# Patient Record
Sex: FEMALE | Employment: FULL TIME | URBAN - METROPOLITAN AREA
[De-identification: names, ages, dates, MRNs, and addresses within clinical notes are randomized per-mention and may not be internally consistent; named-entity substitution may affect disease eponyms.]

---

## 2018-05-23 ENCOUNTER — TRANSFERRED RECORDS (OUTPATIENT)
Dept: MULTI SPECIALTY CLINIC | Facility: CLINIC | Age: 53
End: 2018-05-23

## 2018-05-23 LAB
HPV ABSTRACT: NORMAL
PAP-ABSTRACT: NORMAL

## 2021-12-15 ENCOUNTER — OFFICE VISIT (OUTPATIENT)
Dept: FAMILY MEDICINE | Facility: OTHER | Age: 56
End: 2021-12-15
Payer: COMMERCIAL

## 2021-12-15 VITALS
HEART RATE: 97 BPM | HEIGHT: 63 IN | DIASTOLIC BLOOD PRESSURE: 80 MMHG | TEMPERATURE: 97.8 F | BODY MASS INDEX: 26.58 KG/M2 | WEIGHT: 150 LBS | OXYGEN SATURATION: 97 % | RESPIRATION RATE: 20 BRPM | SYSTOLIC BLOOD PRESSURE: 110 MMHG

## 2021-12-15 DIAGNOSIS — M79.601 PAIN OF RIGHT UPPER EXTREMITY: Primary | ICD-10-CM

## 2021-12-15 PROCEDURE — 99203 OFFICE O/P NEW LOW 30 MIN: CPT | Performed by: PHYSICIAN ASSISTANT

## 2021-12-15 ASSESSMENT — MIFFLIN-ST. JEOR: SCORE: 1239.53

## 2021-12-15 ASSESSMENT — PAIN SCALES - GENERAL: PAINLEVEL: MILD PAIN (2)

## 2021-12-15 NOTE — PROGRESS NOTES
"  Assessment & Plan     Pain of right upper extremity  Likely overuse, she will use a thumb spica splint apply ice and/or heat rest and follow-up if not improving         Tobacco Cessation:   reports that she has been smoking cigarettes. She has been smoking about 0.50 packs per day. She has never used smokeless tobacco.    Not discussed will discuss at upcoming visits    BMI:   Estimated body mass index is 26.57 kg/m  as calculated from the following:    Height as of this encounter: 1.6 m (5' 3\").    Weight as of this encounter: 68 kg (150 lb).   Weight management plan: Patient was referred to their PCP to discuss a diet and exercise plan.        Return in about 2 weeks (around 12/29/2021), or if symptoms worsen or fail to improve.    Christina Trujillo PA-C  [facility] ALBER Cuevas is a 56 year old who presents for the following health issues     HPI     Pain History:  When did you first notice your pain? - Less than 1 week   Have you seen anyone else for your pain? No  Where in your body do you have pain? Musculoskeletal problem/pain  Onset/Duration: 1 week   Description  Location: hand, wrist and arm  - right, she describes discomfort up into her mid upper arm as well.  No pain in her elbow neck or shoulder.  Joint Swelling: no  Redness: no  Pain: YES, particularly with the use of the mouse with using the computer she gets discomfort in her forearm even off into her upper arm she describes it as an ache there is no sharp pain.  She is not able to find any areas that are tender to palpation.  Warmth: no  Intensity:  2/10  Progression of Symptoms:  same and intermittent  Accompanying signs and symptoms:   Fevers: no  Numbness/tingling/weakness: no  History  Trauma to the area: YES, overuse that she found that this started after doing a lot of computer work.  Recent illness:  no  Previous similar problem: no  Previous evaluation:  no  Precipitating or alleviating factors:  Aggravating " "factors include: overuse, computer work  Therapies tried and outcome:  Ibuprofen    She does have a history of a right thumb fusion that was done in 2018    Review of Systems   As documented above       Objective    /80   Pulse 97   Temp 97.8  F (36.6  C) (Temporal)   Resp 20   Ht 1.6 m (5' 3\")   Wt 68 kg (150 lb)   SpO2 97%   Breastfeeding No   BMI 26.57 kg/m    Body mass index is 26.57 kg/m .  Physical Exam   GENERAL: healthy, alert and no distress  NECK: No bony point tenderness of the vertebrae or peer support muscles of the cervical spine  MS: Full range of motion of the elbow and wrists, no tenderness to palpation over the medial lateral epicondyle she is generally tender to palpation over the bicep musculature and into the muscles of her forearm she is also generally tender over the dorsal wrist no tenderness over the extensor tendon of the thumb no erythema, edema or gross deformity                "

## 2022-01-06 ENCOUNTER — TELEPHONE (OUTPATIENT)
Dept: FAMILY MEDICINE | Facility: OTHER | Age: 57
End: 2022-01-06
Payer: COMMERCIAL

## 2022-01-06 NOTE — TELEPHONE ENCOUNTER
She should continue to wear her brace until her appointment Monday unless it is increasing her pain, then she should stop using it.  Christina Trujillo PA-C

## 2022-01-06 NOTE — TELEPHONE ENCOUNTER
Work comp wants to know if patient should continue to wear brace or wait until appointment on Monday.  Nina Dahl, CMA

## 2022-01-10 ENCOUNTER — OFFICE VISIT (OUTPATIENT)
Dept: FAMILY MEDICINE | Facility: OTHER | Age: 57
End: 2022-01-10
Payer: OTHER MISCELLANEOUS

## 2022-01-10 VITALS
SYSTOLIC BLOOD PRESSURE: 130 MMHG | WEIGHT: 150 LBS | TEMPERATURE: 97.6 F | HEIGHT: 63 IN | RESPIRATION RATE: 20 BRPM | HEART RATE: 85 BPM | DIASTOLIC BLOOD PRESSURE: 78 MMHG | BODY MASS INDEX: 26.58 KG/M2 | OXYGEN SATURATION: 98 %

## 2022-01-10 DIAGNOSIS — M67.90 TENDINOPATHY: Primary | ICD-10-CM

## 2022-01-10 DIAGNOSIS — M25.531 RIGHT WRIST PAIN: ICD-10-CM

## 2022-01-10 PROCEDURE — 99214 OFFICE O/P EST MOD 30 MIN: CPT | Performed by: FAMILY MEDICINE

## 2022-01-10 RX ORDER — HYDROXYZINE HYDROCHLORIDE 10 MG/1
1 TABLET, FILM COATED ORAL PRN
COMMUNITY
Start: 2022-01-08 | End: 2022-10-24

## 2022-01-10 RX ORDER — HYDROXYZINE HYDROCHLORIDE 25 MG/1
1 TABLET, FILM COATED ORAL
COMMUNITY
Start: 2022-01-08 | End: 2022-10-03

## 2022-01-10 ASSESSMENT — MIFFLIN-ST. JEOR: SCORE: 1239.53

## 2022-01-10 ASSESSMENT — PAIN SCALES - GENERAL: PAINLEVEL: MODERATE PAIN (4)

## 2022-01-10 NOTE — ASSESSMENT & PLAN NOTE
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 Normal shoulder exam  Discussed a trial of therapy. Can use the  Splint as needed during the day if worsening pain. Can return to work with restrictions. Therapy orders placed. Recheck in 6-8 weeks

## 2022-01-17 ENCOUNTER — TELEPHONE (OUTPATIENT)
Dept: FAMILY MEDICINE | Facility: OTHER | Age: 57
End: 2022-01-17
Payer: COMMERCIAL

## 2022-01-17 NOTE — TELEPHONE ENCOUNTER
Patient would like to change her workability to her normal scheduled hours vs 8 hours per day and the hours she works are longer than this.  Please let patient know if this can be done.   A new workability form will need to be completed.  Also patient believes she should be able to room 50-75 percent of the time if using a tower.  Could this also be adjusted on the workability

## 2022-01-19 ENCOUNTER — THERAPY VISIT (OUTPATIENT)
Dept: OCCUPATIONAL THERAPY | Facility: CLINIC | Age: 57
End: 2022-01-19
Attending: FAMILY MEDICINE
Payer: OTHER MISCELLANEOUS

## 2022-01-19 DIAGNOSIS — M25.531 RIGHT WRIST PAIN: ICD-10-CM

## 2022-01-19 DIAGNOSIS — M67.90 TENDINOPATHY: ICD-10-CM

## 2022-01-19 PROCEDURE — 97166 OT EVAL MOD COMPLEX 45 MIN: CPT | Mod: GO | Performed by: OCCUPATIONAL THERAPIST

## 2022-01-19 PROCEDURE — 97535 SELF CARE MNGMENT TRAINING: CPT | Mod: GO | Performed by: OCCUPATIONAL THERAPIST

## 2022-01-19 PROCEDURE — 97112 NEUROMUSCULAR REEDUCATION: CPT | Mod: GO | Performed by: OCCUPATIONAL THERAPIST

## 2022-01-19 NOTE — TELEPHONE ENCOUNTER
Called and spoke with patient. Patient has been informed of provider message below and in good understanding.*Form has been placed in provider box for review   Margarita Ross, CMA

## 2022-01-19 NOTE — PROGRESS NOTES
Hand Therapy Initial Note     Hand Therapy Initial Evaluation    Current Date:  1/19/2022    Diagnosis: Right arm and wrist pain  DOI: December 1st, 2022  DOS: none,  Procedure:  none  Post:  6 weeks    Precautions: none    Subjective:  Faith Bates is a 56 year old female.    Patient reports symptoms of the right arm hand pain which occurred due to computer use. Since onset symptoms are Gradually getting worse.  General health as reported by patient is good.  Pertinent medical history includes:Smoking  Medical allergies:none.  Surgical history: none, other: Thumb MP fusion 2013.  Medication history: Anti-depressants, Sleep.    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  Job Tasks: Computer Work, Lifting, Carrying, Prolonged Sitting, Pushing, Pulling, Repetitive Tasks    Occupational Profile Information:  Right hand dominant  Prior functional level:  no limitations  Patient reports symptoms of pain and ache, all the way up the arm (traces radial nerve distribution). Soft tissue growth noted, that pt reports is there over a year, that is in need of biopsy.   Special tests:  none.    Previous treatment: none, has wrist brace but did not improve symptoms.   Barriers include:none  Mobility: No difficulty  Transportation: drives  Currently working in normal job with restrictions, only on computer 33%, occasional lifting, no gripping no fine motor tasks, 8 hours per day   Leisure activities/hobbies: normal household tasks, flower gardening.       Functional Outcome Measure:   67/82 difficulty with  and fine motor tasks    Objective:  Pain Level (Scale 0-10)   1/19/2022   At Rest 2/10   With Use 5/10     Pain Description  Date 1/19/2022   Location elbow, wrist, hand and upper arm    Pain Quality Aching like a sore muscle   Frequency constant  But intensity varies   Pain is worst  daytime   Exacerbated by  mouse    Relieved by  NSAIDs. Brace helped wrist but arm continued to ache    Progression Gradually getting worse     Edema  Mild bilateral volar hand and web spaces     Sensation   WNL throughout all nerve distributions; per patient report    Proximal radial nerve tension with Internal rotation of shoulder    ROM   WNL    Strength   R 63 L 50      Assessment:  Patient presents with symptoms consistent with diagnosis of right nerve irritability ,  with conservative intervention.     Patient's limitations or Problem List includes:  Pain of the right elbow, wrist, hand and upper arm which interferes with the patient's ability to perform Work Tasks as compared to previous level of function.    Rehab Potential:  Good - Return to full activity, some limitations    Patient will benefit from skilled Occupational Therapy to decrease pain to return to previous activity level and resume normal daily tasks and to reach their rehab potential.    Barriers to Learning:  No barrier    Communication Issues:  Patient appears to be able to clearly communicate and understand verbal and written communication and follow directions correctly.    Chart Review: Chart Review    Identified Performance Deficits: bathing/showering, hygiene and grooming, home establishment and management, meal preparation and cleanup, shopping and work    Assessment of Occupational Performance:  3-5 Performance Deficits    Clinical Decision Making (Complexity): Moderate complexity    Treatment Explanation:  The following has been discussed with the patient:  RX ordered/plan of care  Anticipated outcomes  Possible risks and side effects    Plan:  Frequency:  1 X week, once daily  Duration:  for 6-8 weeks    Treatment Plan:   Therapeutic Exercise:  ANNAOM  Neuromuscular re-education:  Nerve Gliding, Kinesthetic Training and Proprioceptive Training  Manual Techniques:  Myofascial release    Discharge Plan:  Achieve all LTG.  Independent in home treatment program.  Reach maximal  therapeutic benefit.    Home Exercise Program:  Radial nerve mobilization  Heat PRN,   Monitor sleep   Next Visit:  Assess radial nerve pathway   Monitor and adjust to increase  Nerve mobility and reduce pain   Manual nerve mobilization  Taping to reduce nerve irritability

## 2022-01-26 ENCOUNTER — THERAPY VISIT (OUTPATIENT)
Dept: OCCUPATIONAL THERAPY | Facility: CLINIC | Age: 57
End: 2022-01-26
Payer: OTHER MISCELLANEOUS

## 2022-01-26 DIAGNOSIS — M67.90 TENDINOPATHY: Primary | ICD-10-CM

## 2022-01-26 DIAGNOSIS — M77.11 LATERAL EPICONDYLITIS OF RIGHT ELBOW: ICD-10-CM

## 2022-01-26 DIAGNOSIS — M25.531 RIGHT WRIST PAIN: ICD-10-CM

## 2022-01-26 PROCEDURE — 97112 NEUROMUSCULAR REEDUCATION: CPT | Mod: GO | Performed by: OCCUPATIONAL THERAPIST

## 2022-01-26 PROCEDURE — 97110 THERAPEUTIC EXERCISES: CPT | Mod: GO | Performed by: OCCUPATIONAL THERAPIST

## 2022-01-26 NOTE — PROGRESS NOTES
Hand Therapy Soap Note     Current Date:  1/26/2022    Diagnosis: Right arm and wrist pain  DOI: December 1st, 2022  DOS: none,  Procedure:  none  Post:  7 weeks    Precautions: none    Subjective:  Faith Bates is a 56 year old female.    Patient reports symptoms of the right arm hand pain which occurred due to computer use. Since onset symptoms are Gradually getting worse.  General health as reported by patient is good.  Pertinent medical history includes:Smoking  Medical allergies:none.  Surgical history: none, other: Thumb MP fusion 2013.  Medication history: Anti-depressants, Sleep.    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  Job Tasks: Computer Work, Lifting, Carrying, Prolonged Sitting, Pushing, Pulling, Repetitive Tasks    Occupational Profile Information:  Right hand dominant  Prior functional level:  no limitations  Patient reports symptoms of pain and ache, all the way up the arm (traces radial nerve distribution). Soft tissue growth noted, that pt reports is there over a year, that is in need of biopsy.   Special tests:  none.    Previous treatment: none, has wrist brace but did not improve symptoms.   Barriers include:none  Mobility: No difficulty  Transportation: drives  Currently working in normal job with restrictions, only on computer 33%, occasional lifting, no gripping no fine motor tasks, 8 hours per day   Leisure activities/hobbies: normal household tasks, flower gardening.       Functional Outcome Measure:   67/82 difficulty with  and fine motor tasks    Objective:  Pain Level (Scale 0-10)   1/19/2022   At Rest 2/10   With Use 5/10     Pain Description  Date 1/19/2022   Location elbow, wrist, hand and upper arm    Pain Quality Aching like a sore muscle   Frequency constant  But intensity varies   Pain is worst  daytime   Exacerbated by  mouse    Relieved by NSAIDs. Brace helped wrist but arm  continued to ache    Progression Gradually getting worse     Edema  Mild bilateral volar hand and web spaces     Sensation   WNL throughout all nerve distributions; per patient report    Proximal radial nerve tension with Internal rotation of shoulder    ROM   WNL    Strength   R 63 L 50    Neural Tension Testing  RNT: Radial Neurodynamic Test (based on DMITRY Liriano's ULNT)   1/26/2022   0-5 Scale 1/5   Initially symptoms with IR arm adducted   1/26/22 able to abduct to 30 shoulder IR  Position:   0/5: Arm across abdomen in coronal plane  1/5: Depress shoulder, ER to neutral ABD shoulder to 45 degrees  2/5: IR shoulder to end range, keep elbow at 90 degrees  3/5: Extend elbow to 0 degrees  4/5: Fully pronate forearm  5/5: Flex wrist and fingers with UD  Notes:    (+) indicates beyond grade level but less than FDC to next level    (-) indicates over FDC to level    S1  onset/change of patient's symptoms    S2 definite stop point based on patient's discomfort level      Assessment:  Patient presents with symptoms consistent with diagnosis of right nerve irritability ,  with conservative intervention.     Patient's limitations or Problem List includes:  Pain of the right elbow, wrist, hand and upper arm which interferes with the patient's ability to perform Work Tasks as compared to previous level of function.    Rehab Potential:  Good - Return to full activity, some limitations    Patient will benefit from skilled Occupational Therapy to decrease pain to return to previous activity level and resume normal daily tasks and to reach their rehab potential.    Barriers to Learning:  No barrier    Communication Issues:  Patient appears to be able to clearly communicate and understand verbal and written communication and follow directions correctly.    Chart Review: Chart Review    Identified Performance Deficits: bathing/showering, hygiene and grooming, home establishment and management, meal preparation and  cleanup, shopping and work    Assessment of Occupational Performance:  3-5 Performance Deficits    Clinical Decision Making (Complexity): Moderate complexity    Treatment Explanation:  The following has been discussed with the patient:  RX ordered/plan of care  Anticipated outcomes  Possible risks and side effects    Plan:  Frequency:  1 X week, once daily  Duration:  for 6-8 weeks    Treatment Plan:   Therapeutic Exercise:  SIDDHARTH  Neuromuscular re-education:  Nerve Gliding, Kinesthetic Training and Proprioceptive Training  Manual Techniques:  Myofascial release    Discharge Plan:  Achieve all LTG.  Independent in home treatment program.  Reach maximal therapeutic benefit.    Home Exercise Program:  Radial nerve mobilization progressed proximal and distal glide  Heat PRN,   Monitor sleep   Relaxation exercise to reduce muscle tension      Next Visit:  Assess radial nerve pathway   Monitor and adjust to increase  Nerve mobility and reduce pain   Manual nerve mobilization  Taping to reduce nerve irritability

## 2022-02-02 ENCOUNTER — THERAPY VISIT (OUTPATIENT)
Dept: OCCUPATIONAL THERAPY | Facility: CLINIC | Age: 57
End: 2022-02-02
Payer: OTHER MISCELLANEOUS

## 2022-02-02 DIAGNOSIS — M25.531 RIGHT WRIST PAIN: ICD-10-CM

## 2022-02-02 DIAGNOSIS — M67.90 TENDINOPATHY: Primary | ICD-10-CM

## 2022-02-02 PROCEDURE — 97110 THERAPEUTIC EXERCISES: CPT | Mod: GO | Performed by: OCCUPATIONAL THERAPIST

## 2022-02-02 PROCEDURE — 97112 NEUROMUSCULAR REEDUCATION: CPT | Mod: GO | Performed by: OCCUPATIONAL THERAPIST

## 2022-02-02 NOTE — PROGRESS NOTES
Hand Therapy Soap Note     Current Date:  2/2/2022    Diagnosis: Right arm and wrist pain  DOI: December 1st, 2022  DOS: none,  Procedure:  none  Post:  8 weeks    Precautions: none    Subjective: I woke up last Friday and it really hurt. It is level 5, but if I try to do a BP it increases.   Faith Bates is a 56 year old female.    Patient reports symptoms of the right arm hand pain which occurred due to computer use. Since onset symptoms are Gradually getting worse.  General health as reported by patient is good.  Pertinent medical history includes:Smoking  Medical allergies:none.  Surgical history: none, other: Thumb MP fusion 2013.  Medication history: Anti-depressants, Sleep.    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  Job Tasks: Computer Work, Lifting, Carrying, Prolonged Sitting, Pushing, Pulling, Repetitive Tasks    Occupational Profile Information:  Right hand dominant  Prior functional level:  no limitations  Patient reports symptoms of pain and ache, all the way up the arm (traces radial nerve distribution). Soft tissue growth noted, that pt reports is there over a year, that is in need of biopsy.   Special tests:  none.    Previous treatment: none, has wrist brace but did not improve symptoms.   Barriers include:none  Mobility: No difficulty  Transportation: drives  Currently working in normal job with restrictions, only on computer 33%, occasional lifting, no gripping no fine motor tasks, 8 hours per day   Leisure activities/hobbies: normal household tasks, flower gardening.       Functional Outcome Measure:   67/82 difficulty with  and fine motor tasks    Objective:  Pain Level (Scale 0-10)   1/19/2022   At Rest 2/10   With Use 5/10     Pain Description  Date 1/19/2022   Location elbow, wrist, hand and upper arm    Pain Quality Aching like a sore muscle   Frequency constant  But intensity varies   Pain is  worst  daytime   Exacerbated by  mouse    Relieved by NSAIDs. Brace helped wrist but arm continued to ache    Progression Gradually getting worse     Edema  Mild bilateral volar hand and web spaces     Sensation   WNL throughout all nerve distributions; per patient report    Proximal radial nerve tension with Internal rotation of shoulder    ROM   WNL    Strength   R 63 L 50    Neural Tension Testing  RNT: Radial Neurodynamic Test (based on DS Deandra's ULNT)   1/26/2022 2/2/2022   0-5 Scale 1/5 1/5   Initially symptoms with IR arm adducted   1/26/22 able to abduct to 30 shoulder IR  2/2/2022 44 degrees Abduction  Position 2/5 able to flex wrist head lateral flexion and extend elbow to -20 (approx)   Position:   0/5: Arm across abdomen in coronal plane  1/5: Depress shoulder, ER to neutral ABD shoulder to 45 degrees  2/5: IR shoulder to end range, keep elbow at 90 degrees  3/5: Extend elbow to 0 degrees  4/5: Fully pronate forearm  5/5: Flex wrist and fingers with UD  Notes:    (+) indicates beyond grade level but less than long-term to next level    (-) indicates over long-term to level    S1  onset/change of patient's symptoms    S2 definite stop point based on patient's discomfort level      Assessment:  Patient presents with symptoms consistent with diagnosis of right nerve irritability ,  with conservative intervention proximal glide has increased but distal nerve hypersensitivity remains. Upgraded proximal nerve glide abduction, added elbow extension to distal glide. Added [place] to decrease pressure over PIN     Patient's limitations or Problem List includes:  Pain of the right elbow, wrist, hand and upper arm which interferes with the patient's ability to perform Work Tasks as compared to previous level of function.    Rehab Potential:  Good - Return to full activity, some limitations    Patient will benefit from skilled Occupational Therapy to decrease pain to return to previous activity level and resume  normal daily tasks and to reach their rehab potential.    Barriers to Learning:  No barrier    Communication Issues:  Patient appears to be able to clearly communicate and understand verbal and written communication and follow directions correctly.    Chart Review: Chart Review    Identified Performance Deficits: bathing/showering, hygiene and grooming, home establishment and management, meal preparation and cleanup, shopping and work    Assessment of Occupational Performance:  3-5 Performance Deficits    Clinical Decision Making (Complexity): Moderate complexity    Treatment Explanation:  The following has been discussed with the patient:  RX ordered/plan of care  Anticipated outcomes  Possible risks and side effects    Plan:  Frequency:  1 X week, once daily  Duration:  for 6-8 weeks    Treatment Plan:   Therapeutic Exercise:  AAROM  Neuromuscular re-education:  Nerve Gliding, Kinesthetic Training and Proprioceptive Training  Manual Techniques:  Myofascial release    Discharge Plan:  Achieve all LTG.  Independent in home treatment program.  Reach maximal therapeutic benefit.    Home Exercise Program:  Radial nerve mobilization progressed proximal and distal glide  Heat PRN,   Monitor sleep   Relaxation exercise to reduce muscle tension  Manual nerve mobilization  Taping to reduce nerve irritability    Next Visit:  Assess radial nerve pathway   Monitor and adjust to increase  Nerve mobility and reduce pain

## 2022-02-16 ENCOUNTER — THERAPY VISIT (OUTPATIENT)
Dept: OCCUPATIONAL THERAPY | Facility: CLINIC | Age: 57
End: 2022-02-16
Payer: OTHER MISCELLANEOUS

## 2022-02-16 DIAGNOSIS — M25.531 RIGHT WRIST PAIN: ICD-10-CM

## 2022-02-16 DIAGNOSIS — M67.90 TENDINOPATHY: ICD-10-CM

## 2022-02-16 PROCEDURE — 97110 THERAPEUTIC EXERCISES: CPT | Mod: GO | Performed by: OCCUPATIONAL THERAPIST

## 2022-02-16 PROCEDURE — 97112 NEUROMUSCULAR REEDUCATION: CPT | Mod: GO | Performed by: OCCUPATIONAL THERAPIST

## 2022-02-16 NOTE — PROGRESS NOTES
Hand Therapy Soap Note     Current Date:  2/16/2022    Diagnosis: Right arm and wrist pain  DOI: December 1st, 2022  DOS: none,  Procedure:  none  Post:  8 weeks    Precautions: none    Subjective: It has been a lot better, the tape really helped.     Faith Bates is a 56 year old female.    Patient reports symptoms of the right arm hand pain which occurred due to computer use. Since onset symptoms are Gradually getting worse.  General health as reported by patient is good.  Pertinent medical history includes:Smoking  Medical allergies:none.  Surgical history: none, other: Thumb MP fusion 2013.  Medication history: Anti-depressants, Sleep.    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  Job Tasks: Computer Work, Lifting, Carrying, Prolonged Sitting, Pushing, Pulling, Repetitive Tasks    Occupational Profile Information:  Right hand dominant  Prior functional level:  no limitations  Patient reports symptoms of pain and ache, all the way up the arm (traces radial nerve distribution). Soft tissue growth noted, that pt reports is there over a year, that is in need of biopsy.   Special tests:  none.    Previous treatment: none, has wrist brace but did not improve symptoms.   Barriers include:none  Mobility: No difficulty  Transportation: drives  Currently working in normal job with restrictions, only on computer 33%, occasional lifting, no gripping no fine motor tasks, 8 hours per day   Leisure activities/hobbies: normal household tasks, flower gardening.       Functional Outcome Measure:   67/82 difficulty with  and fine motor tasks    Objective:  Pain Level (Scale 0-10)   1/19/2022 2/16/2022   At Rest 2/10 0/10   With Use 5/10 2/10     Pain Description  Date 1/19/2022 2/16/2022   Location elbow, wrist, hand and upper arm     Pain Quality Aching like a sore muscle    Frequency constant  But intensity varies    Pain is worst   daytime    Exacerbated by  mouse     Relieved by NSAIDs. Brace helped wrist but arm continued to ache   Tape and nerve glides reduce pain   Progression Gradually getting worse  Getting better     Edema  Mild bilateral volar hand and web spaces     Sensation   WNL throughout all nerve distributions; per patient report    Proximal radial nerve tension with Internal rotation of shoulder    ROM   WNL    Strength   R 63 L 50    Neural Tension Testing  RNT: Radial Neurodynamic Test (based on DS Liriano's ULNT)   1/26/2022 2/2/2022 2/16/2022   0-5 Scale 1/5 1/5 3/5   Initially symptoms with IR arm adducted   1/26/22 able to abduct to 30 shoulder IR  2/2/2022 44 degrees Abduction  Position 2/5 able to flex wrist head lateral flexion and extend elbow to -20 (approx)   Position:   0/5: Arm across abdomen in coronal plane  1/5: Depress shoulder, ER to neutral ABD shoulder to 45 degrees  2/5: IR shoulder to end range, keep elbow at 90 degrees  3/5: Extend elbow to 0 degrees  4/5: Fully pronate forearm  5/5: Flex wrist and fingers with UD  Notes:    (+) indicates beyond grade level but less than MCFP to next level    (-) indicates over MCFP to level    S1  onset/change of patient's symptoms    S2 definite stop point based on patient's discomfort level  2/16/2022 Proximal tension decreased  2/16/2022 minimal tension in hand  2/16/2022 tension over PIN with pronation and elbow extension      Assessment:  Patient presents with symptoms consistent with diagnosis of right nerve irritability, improved nerve glide with conservative intervention. Pain is reducing and nerve mobility increased with nerve mobilization and Luekotape to decrease pressure over PIN.    Patient's limitations or Problem List includes:  Pain of the right elbow, wrist, hand and upper arm which interferes with the patient's ability to perform Work Tasks as compared to previous level of function.    Rehab Potential:  Good - Return to full activity, some  limitations    Patient will benefit from skilled Occupational Therapy to decrease pain to return to previous activity level and resume normal daily tasks and to reach their rehab potential.    Barriers to Learning:  No barrier    Communication Issues:  Patient appears to be able to clearly communicate and understand verbal and written communication and follow directions correctly.    Chart Review: Chart Review    Identified Performance Deficits: bathing/showering, hygiene and grooming, home establishment and management, meal preparation and cleanup, shopping and work    Assessment of Occupational Performance:  3-5 Performance Deficits    Clinical Decision Making (Complexity): Moderate complexity    Treatment Explanation:  The following has been discussed with the patient:  RX ordered/plan of care  Anticipated outcomes  Possible risks and side effects    Plan:  Frequency:  1 X week, once daily  Duration:  for 6-8 weeks    Treatment Plan:   Therapeutic Exercise:  SIDDHARTH  Neuromuscular re-education:  Nerve Gliding, Kinesthetic Training and Proprioceptive Training  Manual Techniques:  Myofascial release    Discharge Plan:  Achieve all LTG.  Independent in home treatment program.  Reach maximal therapeutic benefit.    Home Exercise Program:  Radial nerve mobilization progressed proximal and distal glide  Heat PRN,   Monitor sleep   Relaxation exercise to reduce muscle tension  Manual nerve mobilization  Home Taping to reduce nerve irritability  Dunn wool/wool sleeve (top of sock)     Next Visit:  Monitor and adjust HP to increase  Nerve mobility and reduce pain   Address work station modifications

## 2022-02-23 ENCOUNTER — THERAPY VISIT (OUTPATIENT)
Dept: OCCUPATIONAL THERAPY | Facility: CLINIC | Age: 57
End: 2022-02-23
Payer: OTHER MISCELLANEOUS

## 2022-02-23 DIAGNOSIS — M67.90 TENDINOPATHY: ICD-10-CM

## 2022-02-23 DIAGNOSIS — M25.531 RIGHT WRIST PAIN: ICD-10-CM

## 2022-02-23 PROCEDURE — 97110 THERAPEUTIC EXERCISES: CPT | Mod: GO | Performed by: OCCUPATIONAL THERAPIST

## 2022-02-23 PROCEDURE — 97112 NEUROMUSCULAR REEDUCATION: CPT | Mod: GO | Performed by: OCCUPATIONAL THERAPIST

## 2022-02-23 NOTE — PROGRESS NOTES
Hand Therapy Progress Note     Current Date:  2/23/2022    Diagnosis: Right arm and wrist pain  DOI: December 1st, 2022  DOS: none,  Procedure:  none  Post:  12 weeks    Precautions: none    Subjective: I am going to transfer to [place] in a few weeks.      Faith Bates is a 56 year old female.    Patient reports symptoms of the right arm hand pain which occurred due to computer use. Since onset symptoms are Gradually getting worse.  General health as reported by patient is good.  Pertinent medical history includes:Smoking  Medical allergies:none.  Surgical history: none, other: Thumb MP fusion 2013.  Medication history: Anti-depressants, Sleep.    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  Job Tasks: Computer Work, Lifting, Carrying, Prolonged Sitting, Pushing, Pulling, Repetitive Tasks    Occupational Profile Information:  Right hand dominant  Prior functional level:  no limitations  Patient reports symptoms of pain and ache, all the way up the arm (traces radial nerve distribution). Soft tissue growth noted, that pt reports is there over a year, that is in need of biopsy.   Special tests:  none.    Previous treatment: none, has wrist brace but did not improve symptoms.   Barriers include:none  Mobility: No difficulty  Transportation: drives  Currently working in normal job with restrictions, only on computer 33%, occasional lifting, no gripping no fine motor tasks, 8 hours per day   Leisure activities/hobbies: normal household tasks, flower gardening.       Functional Outcome Measure:   67/82 difficulty with  and fine motor tasks    Objective:  Pain Level (Scale 0-10)   1/19/2022 2/16/2022 2/23/2022   At Rest 2/10 0/10 2/10   With Use 5/10 2/10 3/10      Pain Description  Date 1/19/2022 2/16/2022   Location elbow, wrist, hand and upper arm  Flexor Wad  Extensor Wad   Pain Quality Aching like a sore muscle    Frequency  constant  But intensity varies    Pain is worst  daytime    Exacerbated by  mouse     Relieved by NSAIDs. Brace helped wrist but arm continued to ache   Tape and nerve glides reduce pain   Progression Gradually getting worse  Getting better     Edema  Mild bilateral volar hand and web spaces     Sensation   WNL throughout all nerve distributions; per patient report    Proximal radial nerve tension has resolved.  Distal nerve tension radial nerve (now tolerating full proximal and distal nerve glide.).   ROM   WNL    Strength   R 63 L 50    Neural Tension Testing  RNT: Radial Neurodynamic Test (based on DS Deandra's ULNT)   1/26/2022 2/2/2022 2/16/2022 2/23/2022   0-5 Scale 1/5 1/5 3/5 4/5   Initially symptoms with IR arm adducted   1/26/22 able to abduct to 30 shoulder IR  2/2/2022 44 degrees Abduction  Position 2/5 able to flex wrist head lateral flexion and extend elbow to -20 (approx)   Position:   0/5: Arm across abdomen in coronal plane  1/5: Depress shoulder, ER to neutral ABD shoulder to 45 degrees  2/5: IR shoulder to end range, keep elbow at 90 degrees  3/5: Extend elbow to 0 degrees  4/5: Fully pronate forearm  5/5: Flex wrist and fingers with UD  Notes:    (+) indicates beyond grade level but less than long term to next level    (-) indicates over long term to level    S1  onset/change of patient's symptoms    S2 definite stop point based on patient's discomfort level  2/16/2022 Proximal tension decreased  2/16/2022 minimal tension in hand  2/16/2022 tension over PIN with pronation and elbow extension      Assessment:  Patient presents with symptoms consistent with diagnosis of right nerve irritability, improved nerve glide with conservative intervention. Pain is reducing and nerve mobility increased with nerve mobilization and Luekotape to decrease pressure over PIN.    Patient's limitations or Problem List includes:  Pain of the right elbow, wrist, hand and upper arm which interferes with the patient's  ability to perform Work Tasks as compared to previous level of function.    Rehab Potential:  Good - Return to full activity, some limitations    Patient will benefit from skilled Occupational Therapy to decrease pain to return to previous activity level and resume normal daily tasks and to reach their rehab potential.    Barriers to Learning:  No barrier    Communication Issues:  Patient appears to be able to clearly communicate and understand verbal and written communication and follow directions correctly.    Chart Review: Chart Review    Identified Performance Deficits: bathing/showering, hygiene and grooming, home establishment and management, meal preparation and cleanup, shopping and work    Assessment of Occupational Performance:  3-5 Performance Deficits    Clinical Decision Making (Complexity): Moderate complexity    Treatment Explanation:  The following has been discussed with the patient:  RX ordered/plan of care  Anticipated outcomes  Possible risks and side effects    Plan:  Frequency:  1 X week, once daily  Duration:  for 6-8 weeks    Treatment Plan:   Therapeutic Exercise:  AAROM  Neuromuscular re-education:  Nerve Gliding, Kinesthetic Training and Proprioceptive Training  Manual Techniques:  Myofascial release    Discharge Plan:  Achieve all LTG.  Independent in home treatment program.  Reach maximal therapeutic benefit.    Home Exercise Program:  Radial nerve mobilization progressed proximal and distal glide  Heat PRN,   Monitor sleep   Relaxation exercise to reduce muscle tension  Manual nerve mobilization  Home Taping to reduce nerve irritability  Dunn wool/wool sleeve (top of sock)     Next Visit:  Monitor and adjust HP to increase  Nerve mobility and reduce pain   Address work station modifications

## 2022-03-02 ENCOUNTER — THERAPY VISIT (OUTPATIENT)
Dept: OCCUPATIONAL THERAPY | Facility: CLINIC | Age: 57
End: 2022-03-02
Payer: OTHER MISCELLANEOUS

## 2022-03-02 DIAGNOSIS — M67.90 TENDINOPATHY: ICD-10-CM

## 2022-03-02 DIAGNOSIS — M25.531 RIGHT WRIST PAIN: ICD-10-CM

## 2022-03-02 PROCEDURE — 97140 MANUAL THERAPY 1/> REGIONS: CPT | Mod: GO

## 2022-03-02 PROCEDURE — 97112 NEUROMUSCULAR REEDUCATION: CPT | Mod: GO

## 2022-03-16 ENCOUNTER — THERAPY VISIT (OUTPATIENT)
Dept: OCCUPATIONAL THERAPY | Facility: CLINIC | Age: 57
End: 2022-03-16
Payer: OTHER MISCELLANEOUS

## 2022-03-16 DIAGNOSIS — M25.531 RIGHT WRIST PAIN: ICD-10-CM

## 2022-03-16 DIAGNOSIS — M67.90 TENDINOPATHY: ICD-10-CM

## 2022-03-16 PROCEDURE — 97140 MANUAL THERAPY 1/> REGIONS: CPT | Mod: GO

## 2022-03-16 PROCEDURE — 97035 APP MDLTY 1+ULTRASOUND EA 15: CPT | Mod: GO

## 2022-03-16 PROCEDURE — 97112 NEUROMUSCULAR REEDUCATION: CPT | Mod: GO

## 2022-03-17 ENCOUNTER — OFFICE VISIT (OUTPATIENT)
Dept: FAMILY MEDICINE | Facility: OTHER | Age: 57
End: 2022-03-17
Payer: OTHER MISCELLANEOUS

## 2022-03-17 VITALS
HEART RATE: 16 BPM | SYSTOLIC BLOOD PRESSURE: 126 MMHG | DIASTOLIC BLOOD PRESSURE: 82 MMHG | BODY MASS INDEX: 28.7 KG/M2 | WEIGHT: 162 LBS | RESPIRATION RATE: 16 BRPM | OXYGEN SATURATION: 98 % | TEMPERATURE: 97.9 F

## 2022-03-17 DIAGNOSIS — M77.11 LATERAL EPICONDYLITIS OF RIGHT ELBOW: Primary | ICD-10-CM

## 2022-03-17 PROCEDURE — 99213 OFFICE O/P EST LOW 20 MIN: CPT | Performed by: FAMILY MEDICINE

## 2022-03-17 ASSESSMENT — PAIN SCALES - GENERAL: PAINLEVEL: MODERATE PAIN (4)

## 2022-03-17 NOTE — PROGRESS NOTES
Assessment & Plan     Lateral epicondylitis of right elbow  Pain with resisted wrist extension consistent with lateral epicondylitis.  Made little progress with physical therapy  Advised to start therapy for lateral epicondylitis  Work restrictions updated  Recheck in 6-8 weeks    - Occupational Therapy Referral; Future         Tobacco Cessation:   reports that she has been smoking cigarettes. She has been smoking about 0.50 packs per day. She has never used smokeless tobacco.      Return in about 8 weeks (around 5/12/2022).    Alix Ardon MD  [facility] ALBER Cuevas is a 56 year old who presents for the following health issues     HPI     Concern - Follow up Arm pain- work comp  Onset: 12/1  Description: arm pain  Intensity: moderate  Progression of Symptoms:  improving  Accompanying Signs & Symptoms: none  Previous history of similar problem: none  Precipitating factors:        Worsened by: utilization   Alleviating factors:        Improved by: rest, heat, ibuprofen   Therapies tried and outcome: OT- ongoing     Review of Systems   Constitutional, HEENT, cardiovascular, pulmonary, gi and gu systems are negative, except as otherwise noted.      Objective    /82   Pulse (!) 16   Temp 97.9  F (36.6  C) (Temporal)   Resp 16   Wt 73.5 kg (162 lb)   SpO2 98%   BMI 28.70 kg/m    Body mass index is 28.7 kg/m .  Physical Exam   GENERAL: healthy, alert and no distress  RESP: lungs clear to auscultation - no rales, rhonchi or wheezes  CV: regular rate and rhythm, normal S1 S2, no S3 or S4, no murmur, click or rub, no peripheral edema and peripheral pulses strong  MS: TTP along the lateral epicondyle with worsening pain with resisted extension of the wrist

## 2022-03-23 ENCOUNTER — THERAPY VISIT (OUTPATIENT)
Dept: OCCUPATIONAL THERAPY | Facility: CLINIC | Age: 57
End: 2022-03-23
Payer: OTHER MISCELLANEOUS

## 2022-03-23 DIAGNOSIS — M67.90 TENDINOPATHY: ICD-10-CM

## 2022-03-23 DIAGNOSIS — M77.11 LATERAL EPICONDYLITIS OF RIGHT ELBOW: ICD-10-CM

## 2022-03-23 DIAGNOSIS — M25.531 RIGHT WRIST PAIN: ICD-10-CM

## 2022-03-23 PROCEDURE — 97110 THERAPEUTIC EXERCISES: CPT | Mod: GO

## 2022-03-23 NOTE — PROGRESS NOTES
Hand Therapy Initial Evaluation    Current Date:  3/23/2022    Diagnosis: Lateral epicondylitis of right elbow   DOI: December 1st, 2022    Subjective:  Faith Bates is a 56 year old female.    Patient reports symptoms of the right arm hand pain which occurred due to computer use. Since onset symptoms are Gradually getting worse.  General health as reported by patient is good.  Pertinent medical history includes:Smoking  Medical allergies:none.  Surgical history: none, other: Thumb MP fusion 2013.  Medication history: Anti-depressants, Sleep.    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  Job Tasks: Computer Work, Lifting, Carrying, Prolonged Sitting, Pushing, Pulling, Repetitive Tasks    Occupational Profile Information:  Right hand dominant  Prior functional level:  no limitations  Patient reports symptoms of pain and ache, all the way up the arm (traces radial nerve distribution). Soft tissue growth noted, that pt reports is there over a year, that is in need of biopsy.   Special tests:  none.    Previous treatment: none, has wrist brace but did not improve symptoms.   Barriers include:none  Mobility: No difficulty  Transportation: drives  Currently working in normal job with restrictions, only on computer 33%, occasional lifting, no gripping no fine motor tasks, 8 hours per day   Leisure activities/hobbies: normal household tasks, flower gardening.     Functional Outcome Measure:  Upper Extremity Functional Index Score:  SCORE:   Column Totals: /80: 69   (A lower score indicates greater disability.)    Objective:  Pain Level (Scale 0-10)   1/19/2022 2/16/2022 2/23/2022 3/23/2022   At Rest 2/10 0/10 2/10 2/10   With Use 5/10 2/10 3/10  3/10     Pain Description  Date 1/19/2022 2/16/2022 3/23/2022   Location elbow, wrist, hand and upper arm  Flexor Wad  Extensor Wad PIN site in extensor muscles   Pain Quality Aching like a  sore muscle  ache   Frequency constant  But intensity varies  constant   Pain is worst  daytime  daytime   Exacerbated by  mouse   Mouse and computer work   Relieved by NSAIDs. Brace helped wrist but arm continued to ache   Tape and nerve glides reduce pain Heat, otc medication   Progression Gradually getting worse  Getting better Gradually improving     Edema  Mild bilateral volar hand and web spaces     Sensation   Decreased radial nerve distribution, per patient report.     ROM   WNL    Strength  2/23/2022:  R 63 L 50    Strength   (Measured in pounds)  Pain Report: - none  + mild    ++ moderate    +++ severe    3/23/2022 3/23/2022   Trials L R   1  2  3 58 55  61   Average 58 58     Neural Tension Testing  RNT: Radial Neurodynamic Test (based on DMITRY Liriano's ULNT)   1/26/2022 2/2/2022 2/16/2022 2/23/2022 3/23/2022   0-5 Scale 1/5 1/5 3/5 4/5 3/5   Position:   0/5: Arm across abdomen in coronal plane  1/5: Depress shoulder, ER to neutral ABD shoulder to 45 degrees  2/5: IR shoulder to end range, keep elbow at 90 degrees  3/5: Extend elbow to 0 degrees  4/5: Fully pronate forearm  5/5: Flex wrist and fingers with UD  Notes:    (+) indicates beyond grade level but less than group home to next level    (-) indicates over group home to level    S1  onset/change of patient's symptoms    S2 definite stop point based on patient's discomfort level    Resisted Testing  Pain Report:  - none    + mild    ++ moderate    +++ severe    3/23/2022   Elbow Extension 5/5, 0/10   Elbow Flexion 5/5, 0/10   Supination  5/5, 0/10   Pronation 5/5, 1/10   Wrist Ext with RD, Elbow at side 5/5, 3-4/10   Wrist Ext with UD, Elbow at side 5/5, 7/10   Wrist Flex with RD, Elbow at side 5/5, 0/10   Wrist Flex with UD, Elbow at side 5/5, 0/10   Wrist Flex with RD, Elbow Ext 5/5, 0/10   Wrist Flex with UD, Elbow Ext 5/5, 0/10   EDC with Elbow at side 5/5, 5/10   Long Finger Test 4/5, 5-6/10     Please refer to the daily flowsheet for treatment  provided today.     Assessment:  Response to therapy has been lack of progress in:  Strength:   and pinch  Pain:  Unchanged  Paresthesias:  Radial nerve - Unchanged    Overall Assessment:  Physician orders have changed.  STG/LTG:  STGoals have been reviewed and no progress has been made;  see goal sheet for details and changes.    Plan:  Frequency/Duration:  Recommend continuing to see patient  1 X week, once daily  for 8 weeks  Appropriateness of Rx I have re-evaluated this patient and find that the nature, scope, duration and intensity of the therapy is appropriate for the medical condition of the patient.  Recommendations for Continued Therapy:  Additions to Treatment Plan -  Modalities:  US and Paraffin  Therapeutic Exercise:  AAROM, PROM, Tendon Gliding, Isotonics, Isometrics and Stabilization  Neuromuscular re-education:  Nerve Gliding and Kinesiotaping  Manual Techniques:  Friction massage, Myofascial release and Manual edema mobilization  Orthotic Fabrication:  Static  Self Care:  Self Care Tasks, Ergonomic Considerations and Work Tasks    Treatment Plan:  Therapeutic Exercise:  AAROM  Neuromuscular re-education:  Nerve Gliding, Kinesthetic Training and Proprioceptive Training  Manual Techniques:  Myofascial release    Home Exercise Program:  TENNIS ELBOW PREVENTION  Icing  For muscle pain  Warmth  For nerve symptoms  Ball Massage to Extensors  For 5 minutes prior to exercises  Friction Massage  As needed  Find tender spot at elbow, and go 1 direction over it  Forearm Passive Range of Motion Extensor Stretch  Reps 10, hold 15-30 sec  Sessions per day 3-4  Notes PAIN FREE If painful, start with elbow at side, then slowly extend elbow  Forearm PROM Advanced Flexor Stretch  Reps 10 - hold 15-30 sec  Sessions per day 3-4  Notes hold 15-20 sec start with elbow at side, straighten elbow as far as you can without pain.  Nerve Gliding Proximal Radial  Reps 10 (hold for 5 seconds)  Sessions per day 1-2     Next  Visit:  Review HEP  Discuss wrist brace   MFR  Nerve gliding  K-taping if tolerated well after this appointment

## 2022-03-24 PROBLEM — M67.90 TENDINOPATHY: Status: RESOLVED | Noted: 2022-01-10 | Resolved: 2022-03-24

## 2022-03-24 PROBLEM — M77.11 LATERAL EPICONDYLITIS OF RIGHT ELBOW: Status: RESOLVED | Noted: 2022-03-23 | Resolved: 2022-03-24

## 2022-03-24 PROBLEM — M25.531 RIGHT WRIST PAIN: Status: RESOLVED | Noted: 2022-01-10 | Resolved: 2022-03-24

## 2022-03-30 ENCOUNTER — THERAPY VISIT (OUTPATIENT)
Dept: OCCUPATIONAL THERAPY | Facility: CLINIC | Age: 57
End: 2022-03-30
Payer: OTHER MISCELLANEOUS

## 2022-03-30 DIAGNOSIS — M77.11 LATERAL EPICONDYLITIS OF RIGHT ELBOW: Primary | ICD-10-CM

## 2022-03-30 PROCEDURE — 97112 NEUROMUSCULAR REEDUCATION: CPT | Mod: GO

## 2022-03-30 PROCEDURE — 97035 APP MDLTY 1+ULTRASOUND EA 15: CPT | Mod: GO

## 2022-03-30 PROCEDURE — 97140 MANUAL THERAPY 1/> REGIONS: CPT | Mod: GO

## 2022-04-20 ENCOUNTER — MYC MEDICAL ADVICE (OUTPATIENT)
Dept: FAMILY MEDICINE | Facility: OTHER | Age: 57
End: 2022-04-20
Payer: COMMERCIAL

## 2022-05-13 ENCOUNTER — OFFICE VISIT (OUTPATIENT)
Dept: FAMILY MEDICINE | Facility: OTHER | Age: 57
End: 2022-05-13
Payer: OTHER MISCELLANEOUS

## 2022-05-13 VITALS
BODY MASS INDEX: 28.35 KG/M2 | TEMPERATURE: 97.4 F | OXYGEN SATURATION: 97 % | SYSTOLIC BLOOD PRESSURE: 129 MMHG | HEIGHT: 63 IN | WEIGHT: 160 LBS | DIASTOLIC BLOOD PRESSURE: 87 MMHG | HEART RATE: 84 BPM

## 2022-05-13 DIAGNOSIS — M77.11 LATERAL EPICONDYLITIS OF RIGHT ELBOW: ICD-10-CM

## 2022-05-13 PROCEDURE — 99213 OFFICE O/P EST LOW 20 MIN: CPT | Performed by: FAMILY MEDICINE

## 2022-05-13 ASSESSMENT — PAIN SCALES - GENERAL: PAINLEVEL: NO PAIN (0)

## 2022-05-13 NOTE — ASSESSMENT & PLAN NOTE
Significantly improved symptoms since changing her job and completing therapy  Exam is normal  Forms filled to be released to work with out any restrictions  Reportable signs and symptoms discussed  RTC if symptoms persist or fail to improve

## 2022-05-13 NOTE — PROGRESS NOTES
"  Assessment & Plan   Problem List Items Addressed This Visit     Lateral epicondylitis of right elbow     Significantly improved symptoms since changing her job and completing therapy  Exam is normal  Forms filled to be released to work with out any restrictions  Reportable signs and symptoms discussed  RTC if symptoms persist or fail to improve                    Tobacco Cessation:   reports that she has been smoking cigarettes. She has been smoking about 0.50 packs per day. She has never used smokeless tobacco.      See Patient Instructions    No follow-ups on file.    Alix Ardon MD  [facility] ALBER Cuevas is a 56 year old who presents for the following health issues     History of Present Illness       Reason for visit:  Work comp    She eats 2-3 servings of fruits and vegetables daily.She consumes 0 sweetened beverage(s) daily.She exercises with enough effort to increase her heart rate 10 to 19 minutes per day.    She is taking medications regularly.           Review of Systems   Constitutional, HEENT, cardiovascular, pulmonary, GI, , musculoskeletal, neuro, skin, endocrine and psych systems are negative, except as otherwise noted.      Objective    /87 (BP Location: Right arm)   Pulse 84   Temp 97.4  F (36.3  C) (Temporal)   Ht 1.6 m (5' 3\")   Wt 72.6 kg (160 lb)   SpO2 97%   BMI 28.34 kg/m    Body mass index is 28.34 kg/m .  Physical Exam   GENERAL: healthy, alert and no distress  MS: no gross musculoskeletal defects noted, no edema        "

## 2022-05-29 ENCOUNTER — HEALTH MAINTENANCE LETTER (OUTPATIENT)
Age: 57
End: 2022-05-29

## 2022-08-01 ENCOUNTER — OFFICE VISIT (OUTPATIENT)
Dept: FAMILY MEDICINE | Facility: CLINIC | Age: 57
End: 2022-08-01
Payer: COMMERCIAL

## 2022-08-01 VITALS
WEIGHT: 160 LBS | SYSTOLIC BLOOD PRESSURE: 126 MMHG | BODY MASS INDEX: 28.35 KG/M2 | DIASTOLIC BLOOD PRESSURE: 82 MMHG | HEIGHT: 63 IN | TEMPERATURE: 97.7 F

## 2022-08-01 DIAGNOSIS — K11.1 SALIVARY GLAND ENLARGEMENT: ICD-10-CM

## 2022-08-01 DIAGNOSIS — R59.1 LYMPHADENOPATHY: Primary | ICD-10-CM

## 2022-08-01 PROCEDURE — 99213 OFFICE O/P EST LOW 20 MIN: CPT | Performed by: PHYSICIAN ASSISTANT

## 2022-08-01 ASSESSMENT — PAIN SCALES - GENERAL: PAINLEVEL: NO PAIN (0)

## 2022-08-01 NOTE — PROGRESS NOTES
"Admit  Assessment & Plan     Lymphadenopathy  We will have her treat with Lexapro, follow-up if this is not improved in 7 to 10 days  - amoxicillin-clavulanate (AUGMENTIN) 875-125 MG tablet; Take 1 tablet by mouth 2 times daily    Salivary gland enlargement  She needs ibuprofen and also eat sour foods in hopes of flushing out any potential stones I do not palpate any follow-up if not improving  - amoxicillin-clavulanate (AUGMENTIN) 875-125 MG tablet; Take 1 tablet by mouth 2 times daily       Nicotine/Tobacco Cessation:  She reports that she has been smoking cigarettes. She has been smoking about 0.50 packs per day. She has never used smokeless tobacco.  Nicotine/Tobacco Cessation Plan:   Information offered: Patient not interested at this time      BMI:   Estimated body mass index is 28.34 kg/m  as calculated from the following:    Height as of this encounter: 1.6 m (5' 3\").    Weight as of this encounter: 72.6 kg (160 lb).   Weight management plan: Patient was referred to their PCP to discuss a diet and exercise plan.        Return in about 1 week (around 8/8/2022).    MAHESH Reeves [facility] QUINTEN Cuevas is a 57 year old, presenting for the following health issues:  No chief complaint on file.      HPI     Right ear pain/lump    For the last 2 days she has noticed after eating at the right side of her face and neck it is hard and swollen.  As it swollen it becomes more tender.  It is now starting to cause pain in her right ear.  She is not having fevers, chills, or sore throat.  Her throat and mouth do not feel dry.    Review of Systems   As documented above       Objective    /82   Temp 97.7  F (36.5  C) (Temporal)   Ht 1.6 m (5' 3\")   Wt 72.6 kg (160 lb)   BMI 28.34 kg/m    Body mass index is 28.34 kg/m .  Physical Exam   GENERAL: healthy, alert and no distress  EYES: Eyes grossly normal to inspection, PERRL and conjunctivae and sclerae normal  HENT: ear canals " -partial cerumen impaction right ear canal and TM's normal, nose and mouth without ulcers or lesions, she is not tender to palpation within her masseter musculature or TM joint  NECK: cervical adenopathy on the right superior cervical/submandibular region and thyroid normal to palpation  PSYCH: mentation appears normal, affect normal/bright                    .  ..

## 2022-09-29 ASSESSMENT — ENCOUNTER SYMPTOMS
FREQUENCY: 0
DYSURIA: 0
FEVER: 0
PARESTHESIAS: 0
CONSTIPATION: 0
NAUSEA: 0
ARTHRALGIAS: 0
CHILLS: 0
HEMATURIA: 0
HEADACHES: 0
SHORTNESS OF BREATH: 0
MYALGIAS: 0
PALPITATIONS: 0
ABDOMINAL PAIN: 0
DIARRHEA: 0
EYE PAIN: 0
DIZZINESS: 0
SORE THROAT: 0
HEARTBURN: 0
NERVOUS/ANXIOUS: 0
BREAST MASS: 0
WEAKNESS: 0
JOINT SWELLING: 0
HEMATOCHEZIA: 0
COUGH: 0

## 2022-10-03 ENCOUNTER — HEALTH MAINTENANCE LETTER (OUTPATIENT)
Age: 57
End: 2022-10-03

## 2022-10-05 ENCOUNTER — OFFICE VISIT (OUTPATIENT)
Dept: FAMILY MEDICINE | Facility: OTHER | Age: 57
End: 2022-10-05
Payer: COMMERCIAL

## 2022-10-05 VITALS
DIASTOLIC BLOOD PRESSURE: 78 MMHG | TEMPERATURE: 97.2 F | WEIGHT: 162 LBS | HEIGHT: 62 IN | HEART RATE: 94 BPM | SYSTOLIC BLOOD PRESSURE: 132 MMHG | OXYGEN SATURATION: 97 % | BODY MASS INDEX: 29.81 KG/M2

## 2022-10-05 DIAGNOSIS — N89.8 FOUL SMELLING VAGINAL DISCHARGE: ICD-10-CM

## 2022-10-05 DIAGNOSIS — F33.1 MAJOR DEPRESSIVE DISORDER, RECURRENT EPISODE, MODERATE (H): ICD-10-CM

## 2022-10-05 DIAGNOSIS — L98.9 SKIN LESION: ICD-10-CM

## 2022-10-05 DIAGNOSIS — Z13.220 SCREENING FOR HYPERLIPIDEMIA: ICD-10-CM

## 2022-10-05 DIAGNOSIS — F41.1 GENERALIZED ANXIETY DISORDER: ICD-10-CM

## 2022-10-05 DIAGNOSIS — Z12.31 ENCOUNTER FOR SCREENING MAMMOGRAM FOR BREAST CANCER: ICD-10-CM

## 2022-10-05 DIAGNOSIS — Z00.00 ROUTINE GENERAL MEDICAL EXAMINATION AT A HEALTH CARE FACILITY: Primary | ICD-10-CM

## 2022-10-05 DIAGNOSIS — F13.10 BENZODIAZEPINE ABUSE (H): ICD-10-CM

## 2022-10-05 DIAGNOSIS — Z13.29 SCREENING FOR THYROID DISORDER: ICD-10-CM

## 2022-10-05 DIAGNOSIS — F11.21 OPIOID DEPENDENCE IN REMISSION (H): ICD-10-CM

## 2022-10-05 DIAGNOSIS — F10.29 ALCOHOL DEPENDENCE WITH UNSPECIFIED ALCOHOL-INDUCED DISORDER (H): ICD-10-CM

## 2022-10-05 DIAGNOSIS — F17.200 TOBACCO DEPENDENCE: ICD-10-CM

## 2022-10-05 DIAGNOSIS — Z87.891 PERSONAL HISTORY OF TOBACCO USE: ICD-10-CM

## 2022-10-05 LAB
ALBUMIN SERPL-MCNC: 4.2 G/DL (ref 3.4–5)
ALBUMIN UR-MCNC: NEGATIVE MG/DL
ALP SERPL-CCNC: 77 U/L (ref 40–150)
ALT SERPL W P-5'-P-CCNC: 43 U/L (ref 0–50)
ANION GAP SERPL CALCULATED.3IONS-SCNC: 2 MMOL/L (ref 3–14)
APPEARANCE UR: CLEAR
AST SERPL W P-5'-P-CCNC: 21 U/L (ref 0–45)
BILIRUB SERPL-MCNC: 0.4 MG/DL (ref 0.2–1.3)
BILIRUB UR QL STRIP: NEGATIVE
BUN SERPL-MCNC: 11 MG/DL (ref 7–30)
CALCIUM SERPL-MCNC: 9.3 MG/DL (ref 8.5–10.1)
CHLORIDE BLD-SCNC: 111 MMOL/L (ref 94–109)
CHOLEST SERPL-MCNC: 261 MG/DL
CLUE CELLS: ABNORMAL
CO2 SERPL-SCNC: 28 MMOL/L (ref 20–32)
COLOR UR AUTO: YELLOW
CREAT SERPL-MCNC: 0.56 MG/DL (ref 0.52–1.04)
FASTING STATUS PATIENT QL REPORTED: NO
GFR SERPL CREATININE-BSD FRML MDRD: >90 ML/MIN/1.73M2
GLUCOSE BLD-MCNC: 103 MG/DL (ref 70–99)
GLUCOSE UR STRIP-MCNC: NEGATIVE MG/DL
HDLC SERPL-MCNC: 61 MG/DL
HGB UR QL STRIP: NEGATIVE
KETONES UR STRIP-MCNC: NEGATIVE MG/DL
LDLC SERPL CALC-MCNC: 183 MG/DL
LEUKOCYTE ESTERASE UR QL STRIP: NEGATIVE
NITRATE UR QL: NEGATIVE
NONHDLC SERPL-MCNC: 200 MG/DL
PH UR STRIP: 7 [PH] (ref 5–7)
POTASSIUM BLD-SCNC: 4.4 MMOL/L (ref 3.4–5.3)
PROT SERPL-MCNC: 7.8 G/DL (ref 6.8–8.8)
SODIUM SERPL-SCNC: 141 MMOL/L (ref 133–144)
SP GR UR STRIP: 1.01 (ref 1–1.03)
TRICHOMONAS, WET PREP: ABNORMAL
TRIGL SERPL-MCNC: 86 MG/DL
TSH SERPL DL<=0.005 MIU/L-ACNC: 2.07 MU/L (ref 0.4–4)
UROBILINOGEN UR STRIP-ACNC: 0.2 E.U./DL
WBC'S/HIGH POWER FIELD, WET PREP: ABNORMAL
YEAST, WET PREP: ABNORMAL

## 2022-10-05 PROCEDURE — 84443 ASSAY THYROID STIM HORMONE: CPT | Performed by: NURSE PRACTITIONER

## 2022-10-05 PROCEDURE — 80061 LIPID PANEL: CPT | Performed by: NURSE PRACTITIONER

## 2022-10-05 PROCEDURE — 87210 SMEAR WET MOUNT SALINE/INK: CPT | Performed by: NURSE PRACTITIONER

## 2022-10-05 PROCEDURE — 36415 COLL VENOUS BLD VENIPUNCTURE: CPT | Performed by: NURSE PRACTITIONER

## 2022-10-05 PROCEDURE — 99396 PREV VISIT EST AGE 40-64: CPT | Performed by: NURSE PRACTITIONER

## 2022-10-05 PROCEDURE — 87070 CULTURE OTHR SPECIMN AEROBIC: CPT | Performed by: NURSE PRACTITIONER

## 2022-10-05 PROCEDURE — 81003 URINALYSIS AUTO W/O SCOPE: CPT | Performed by: NURSE PRACTITIONER

## 2022-10-05 PROCEDURE — 80053 COMPREHEN METABOLIC PANEL: CPT | Performed by: NURSE PRACTITIONER

## 2022-10-05 PROCEDURE — G0296 VISIT TO DETERM LDCT ELIG: HCPCS | Performed by: NURSE PRACTITIONER

## 2022-10-05 PROCEDURE — 99214 OFFICE O/P EST MOD 30 MIN: CPT | Mod: 25 | Performed by: NURSE PRACTITIONER

## 2022-10-05 RX ORDER — SUCRALFATE 1 G/1
1 TABLET ORAL 4 TIMES DAILY
COMMUNITY
End: 2022-10-05

## 2022-10-05 RX ORDER — HYDROXYZINE HYDROCHLORIDE 10 MG/1
10 TABLET, FILM COATED ORAL EVERY 6 HOURS PRN
COMMUNITY
Start: 2022-08-01 | End: 2022-10-05

## 2022-10-05 RX ORDER — TRAZODONE HYDROCHLORIDE 50 MG/1
50-100 TABLET, FILM COATED ORAL AT BEDTIME
COMMUNITY
Start: 2021-04-26 | End: 2022-10-05

## 2022-10-05 RX ORDER — HYDROXYZINE HYDROCHLORIDE 25 MG/1
25-50 TABLET, FILM COATED ORAL
COMMUNITY
Start: 2022-06-22 | End: 2022-10-05

## 2022-10-05 ASSESSMENT — ENCOUNTER SYMPTOMS
ABDOMINAL PAIN: 0
COUGH: 0
FREQUENCY: 0
MYALGIAS: 0
HEMATOCHEZIA: 0
DIZZINESS: 0
HEARTBURN: 0
CONSTIPATION: 0
HEMATURIA: 0
PALPITATIONS: 0
NERVOUS/ANXIOUS: 0
FEVER: 0
ARTHRALGIAS: 0
EYE PAIN: 0
PARESTHESIAS: 0
SORE THROAT: 0
BREAST MASS: 0
SHORTNESS OF BREATH: 0
DIARRHEA: 0
DYSURIA: 0
CHILLS: 0
JOINT SWELLING: 0
WEAKNESS: 0
HEADACHES: 0
NAUSEA: 0

## 2022-10-05 ASSESSMENT — PATIENT HEALTH QUESTIONNAIRE - PHQ9: SUM OF ALL RESPONSES TO PHQ QUESTIONS 1-9: 4

## 2022-10-05 ASSESSMENT — PAIN SCALES - GENERAL: PAINLEVEL: NO PAIN (0)

## 2022-10-05 NOTE — PROGRESS NOTES
SUBJECTIVE:   CC: Faith is an 57 year old who presents for preventive health visit.       Patient has been advised of split billing requirements and indicates understanding: Yes  Healthy Habits:     Getting at least 3 servings of Calcium per day:  Yes    Bi-annual eye exam:  NO    Dental care twice a year:  NO    Sleep apnea or symptoms of sleep apnea:  None    Diet:  Gluten-free/reduced    Medication side effects:  None    PHQ-2 Total Score: 0    Substance abuse  working with her now   Drinking may show in labs and she wants me to know that  She is working on it.   Started with her addiction with opioids, and on 5 different medications that were addiction and she thinks she flipped one addiction to another. Some weeks she drinks every single day. Now she is at a point where it is 3-4 days at a time without alcohol. Starting to limit herself.     Hydroxyzine Does not take it everyday, maybe take 2 at night for sleep or as needed.     Colonoscopy done on this date 04/18/2016: (approximately), by this group: [facility], results were Normal. .   Pap smear done on this date: 05/23/2018 (approximately), by this group: [facility] , results were Normal/Negative.     4 scabs on right arm and old SK lesion    No flowsheet data found.    Today's PHQ-2 Score:   PHQ-2 ( 1999 [employer]) 9/29/2022   Q1: Little interest or pleasure in doing things 0   Q2: Feeling down, depressed or hopeless 0   PHQ-2 Score 0   Q1: Little interest or pleasure in doing things Not at all   Q2: Feeling down, depressed or hopeless Not at all   PHQ-2 Score 0       Abuse: Current or Past (Physical, Sexual or Emotional) - No  Do you feel safe in your environment? Yes    Have you ever done Advance Care Planning? (For example, a Health Directive, POLST, or a discussion with a medical provider or your loved ones about your wishes): No, advance care planning information given to patient to review.  Patient plans to discuss their wishes with loved  ones or provider.      Social History     Tobacco Use     Smoking status: Current Every Day Smoker     Packs/day: 0.50     Years: 22.00     Pack years: 11.00     Types: Cigarettes     Start date: 1/1/1979     Smokeless tobacco: Never Used   Substance Use Topics     Alcohol use: Yes         Alcohol Use 9/29/2022   Prescreen: >3 drinks/day or >7 drinks/week? No       Reviewed orders with patient.  Reviewed health maintenance and updated orders accordingly - Yes  Lab work is in process    Breast Cancer Screening:    Breast CA Risk Assessment (FHS-7) 9/29/2022   Do you have a family history of breast, colon, or ovarian cancer? No / Unknown       click delete button to remove this line now  Mammogram Screening: Recommended mammography every 1-2 years with patient discussion and risk factor consideration  Pertinent mammograms are reviewed under the imaging tab.    History of abnormal Pap smear: NO - age 30-65 PAP every 5 years with negative HPV co-testing recommended  Last 3 Pap Results: No results found for: PAP     Reviewed and updated as needed this visit by clinical staff   Tobacco  Allergies  Meds   Med Hx  Surg Hx  Fam Hx            Reviewed and updated as needed this visit by Provider                   History reviewed. No pertinent past medical history.   History reviewed. No pertinent surgical history.    Review of Systems   Constitutional: Negative for chills and fever.   HENT: Negative for congestion, ear pain, hearing loss and sore throat.    Eyes: Negative for pain and visual disturbance.   Respiratory: Negative for cough and shortness of breath.    Cardiovascular: Negative for chest pain, palpitations and peripheral edema.   Gastrointestinal: Negative for abdominal pain, constipation, diarrhea, heartburn, hematochezia and nausea.   Breasts:  Negative for tenderness, breast mass and discharge.   Genitourinary: Negative for dysuria, frequency, genital sores, hematuria, pelvic pain, urgency, vaginal  "bleeding and vaginal discharge.   Musculoskeletal: Negative for arthralgias, joint swelling and myalgias.   Skin: Positive for rash.   Neurological: Negative for dizziness, weakness, headaches and paresthesias.   Psychiatric/Behavioral: Negative for mood changes. The patient is not nervous/anxious.      OBJECTIVE:   /78   Pulse 94   Temp 97.2  F (36.2  C) (Temporal)   Ht 1.575 m (5' 2\")   Wt 73.5 kg (162 lb)   SpO2 97%   BMI 29.63 kg/m    Physical Exam  GENERAL: healthy, alert and no distress  EYES: Eyes grossly normal to inspection, PERRL and conjunctivae and sclerae normal  HENT: ear canals and TM's normal, nose and mouth without ulcers or lesions  NECK: no adenopathy, no asymmetry, masses, or scars and thyroid normal to palpation  RESP: lungs clear to auscultation - no rales, rhonchi or wheezes  BREAST: normal without masses, tenderness or nipple discharge and no palpable axillary masses or adenopathy  CV: regular rate and rhythm, normal S1 S2, no S3 or S4, no murmur, click or rub, no peripheral edema and peripheral pulses strong  ABDOMEN: soft, nontender, no hepatosplenomegaly, no masses and bowel sounds normal  MS: no gross musculoskeletal defects noted, no edema  SKIN: 4 scabbed lesions along the anterior right arm. 1 scaly lesion along right forearm consistent with SK vs AK.   NEURO: Normal strength and tone, mentation intact and speech normal  PSYCH: mentation appears normal, affect normal/bright  LYMPH: no cervical, supraclavicular, axillary, or inguinal adenopathy    Diagnostic Test Results:  Labs reviewed in Epic    ASSESSMENT/PLAN:   (Z00.00) Routine general medical examination at a health care facility  (primary encounter diagnosis)  Comment:   Plan: Comprehensive metabolic panel (BMP + Alb, Alk         Phos, ALT, AST, Total. Bili, TP)        Updated HM- plans to do influenza at work   Declined Covid and PCV vaccine     (F10.29) Alcohol dependence with unspecified alcohol-induced disorder " (H)  Comment: Working on decreasing her alcohol use. Using a support group.   Plan: Comprehensive metabolic panel (BMP + Alb, Alk         Phos, ALT, AST, Total. Bili, TP)        Monitor liver functions     (F11.21) Opioid dependence in remission (H)  Comment:   Plan: Comprehensive metabolic panel (BMP + Alb, Alk         Phos, ALT, AST, Total. Bili, TP)        Doing well with no recent use and in remission     (Z13.220) Screening for hyperlipidemia  Comment:   Plan: Lipid panel reflex to direct LDL Non-fasting            (F17.200) Tobacco dependence  Comment:   Plan: Comprehensive metabolic panel (BMP + Alb, Alk         Phos, ALT, AST, Total. Bili, TP)        Declined smoking cessation  Recommend lung screening and she was receptive and agreeable to this today     (F13.10) Benzodiazepine abuse (H)  Comment:   Plan: Resolved and in remission     (F41.1) Generalized anxiety disorder  Comment:   Plan: PRN atarax. Ok for refills. Just had a refill so did not need this today.     (F33.1) Major depressive disorder, recurrent episode, moderate (H)  Comment:   Plan: Stable     (L98.9) Skin lesion  Comment:   Plan: Skin Aerobic Bacterial Culture Routine, Adult         Dermatology Referral        Recommend given the scabs she has that we do wound culture today and recommend dermatology consult.   Office visit level 4    (N89.8) Foul smelling vaginal discharge  Comment: Sweating a lot during the day, especially when not using alcohol. Recommend UA and wet prep today   Plan: UA Macro with Reflex to Micro and Culture - lab        collect, Wet prep - lab collect    Office Visit level 4             (Z12.31) Encounter for screening mammogram for breast cancer  Comment:   Plan: MA Screening Digital Bilateral            (Z87.891) Personal history of tobacco use  Comment:   Plan: Prof fee: Shared Decision Making for Lung         Cancer Screening, CT Chest Lung Cancer Scrn Low        Dose wo            (Z13.29) Screening for thyroid  "disorder  Comment:   Plan: TSH with free T4 reflex            Overall doing well and motivated to work on her alcohol use.   No concerns at this time, will review labs.     The patient indicates understanding of these issues and agrees with the plan.        Patient has been advised of split billing requirements and indicates understanding: Yes    COUNSELING:  Reviewed preventive health counseling, as reflected in patient instructions    Estimated body mass index is 29.63 kg/m  as calculated from the following:    Height as of this encounter: 1.575 m (5' 2\").    Weight as of this encounter: 73.5 kg (162 lb).    Weight management plan: Discussed healthy diet and exercise guidelines    She reports that she has been smoking cigarettes. She started smoking about 43 years ago. She has a 11.00 pack-year smoking history. She has never used smokeless tobacco.  Nicotine/Tobacco Cessation Plan:   Information offered: Patient not interested at this time      Counseling Resources:  ATP IV Guidelines  Pooled Cohorts Equation Calculator  Breast Cancer Risk Calculator  BRCA-Related Cancer Risk Assessment: FHS-7 Tool  FRAX Risk Assessment  ICSI Preventive Guidelines  Dietary Guidelines for Americans, 2010  [employer] MyPlate  ASA Prophylaxis  Lung CA Screening    PAMELA Osborne [facility]  Lung Cancer Screening Shared Decision Making Visit     Faith Bates, a 57 year old female, is eligible for lung cancer screening    History   Smoking Status     Current Every Day Smoker     Packs/day: 0.50     Years: 22.00     Types: Cigarettes     Start date: 1/1/1979   Smokeless Tobacco     Never Used       I have discussed with patient the risks and benefits of screening for lung cancer with low-dose CT.     The risks include:    radiation exposure: one low dose chest CT has as much ionizing radiation as about 15 chest x-rays, or 6 months of background radiation living in [place]      false positives: most " findings/nodules are NOT cancer, but some might still require additional diagnostic evaluation, including biopsy    over-diagnosis: some slow growing cancers that might never have been clinically significant will be detected and treated unnecessarily     The benefit of early detection of lung cancer is contingent upon adherence to annual screening or more frequent follow up if indicated.     Furthermore, to benefit from screening, Faith must be willing and able to undergo diagnostic procedures, if indicated. Although no specific guide is available for determining severity of comorbidities, it is reasonable to withhold screening in patients who have greater mortality risk from other diseases.     We did discuss that the best way to prevent lung cancer is to not smoke.    Some patients may value a numeric estimation of lung cancer risk when evaluating if lung cancer screening is right for them, here is one calculator:    ShouldIScreen

## 2022-10-05 NOTE — Clinical Note
Colonoscopy done on this date 04/18/2016: (approximately), by this group: [facility], results were Normal. .  Pap smear done on this date: 05/23/2018 (approximately), by this group: [facility] , results were Normal/Negative.

## 2022-10-05 NOTE — LETTER
My Depression Action Plan  Name: Faith Bates   Date of Birth 1965  Date: 10/5/2022    My doctor: PAMELA Osborne CNP  My clinic: [facility]  [address]  [place] [address]  [phone]          GREEN    ZONE   Good Control    What it looks like:     Things are going generally well. You have normal ups and downs. You may even feel depressed from time to time, but bad moods usually last less than a day.   What you need to do:  1. Continue to care for yourself (see self care plan)  2. Check your depression survival kit and update it as needed  3. Follow your physician s recommendations including any medication.  4. Do not stop taking medication unless you consult with your physician first.           YELLOW         ZONE Getting Worse    What it looks like:     Depression is starting to interfere with your life.     It may be hard to get out of bed; you may be starting to isolate yourself from others.    Symptoms of depression are starting to last most all day and this has happened for several days.     You may have suicidal thoughts but they are not constant.   What you need to do:     1. Call your care team. Your response to treatment will improve if you keep your care team informed of your progress. Yellow periods are signs an adjustment may need to be made.     2. Continue your self-care.  Just get dressed and ready for the day.  Don't give yourself time to talk yourself out of it.    3. Talk to someone in your support network.    4. Open up your Depression Self-Care Plan/Wellness Kit.           RED    ZONE Medical Alert - Get Help    What it looks like:     Depression is seriously interfering with your life.     You may experience these or other symptoms: You can t get out of bed most days, can t work or engage in other necessary activities, you have trouble taking care of basic hygiene, or basic responsibilities, thoughts of suicide or death  that will not go away, self-injurious behavior.     What you need to do:  1. Call your care team and request a same-day appointment. If they are not available (weekends or after hours) call your local crisis line, emergency room or 911.          Depression Self-Care Plan / Wellness Kit    Many people find that medication and therapy are helpful treatments for managing depression. In addition, making small changes to your everyday life can help to boost your mood and improve your wellbeing. Below are some tips for you to consider. Be sure to talk with your medical provider and/or behavioral health consultant if your symptoms are worsening or not improving.     Sleep   Sleep hygiene  means all of the habits that support good, restful sleep. It includes maintaining a consistent bedtime and wake time, using your bedroom only for sleeping or sex, and keeping the bedroom dark and free of distractions like a computer, smartphone, or television.     Develop a Healthy Routine  Maintain good hygiene. Get out of bed in the morning, make your bed, brush your teeth, take a shower, and get dressed. Don t spend too much time viewing media that makes you feel stressed. Find time to relax each day.    Exercise  Get some form of exercise every day. This will help reduce pain and release endorphins, the  feel good  chemicals in your brain. It can be as simple as just going for a walk or doing some gardening, anything that will get you moving.      Diet  Strive to eat healthy foods, including fruits and vegetables. Drink plenty of water. Avoid excessive sugar, caffeine, alcohol, and other mood-altering substances.     Stay Connected with Others  Stay in touch with friends and family members.    Manage Your Mood  Try deep breathing, massage therapy, biofeedback, or meditation. Take part in fun activities when you can. Try to find something to smile about each day.     Psychotherapy  Be open to working with a therapist if your provider  recommends it.     Medication  Be sure to take your medication as prescribed. Most anti-depressants need to be taken every day. It usually takes several weeks for medications to work. Not all medicines work for all people. It is important to follow-up with your provider to make sure you have a treatment plan that is working for you. Do not stop your medication abruptly without first discussing it with your provider.    Crisis Resources   These hotlines are for both adults and children. They and are open 24 hours a day, 7 days a week unless noted otherwise.      National Suicide Prevention Lifeline   988 or 2-160-457-TAZS (2976)      Crisis Text Line    www.crisistextline.org  Text HOME to 204985 from anywhere in the [place], anytime, about any type of crisis. A live, trained crisis counselor will receive the text and respond quickly.      [facility] Lifeline for LGBTQ Youth  A national crisis intervention and suicide lifeline for LGBTQ youth under 25. Provides a safe place to talk without judgement. Call 1-741.519.2943; text START to 177215 or visit www.thetrevorproject.org to talk to a trained counselor.      For [facility] crisis numbers, visit the [facility] website at:  https://[place].gov/dhs/people-we-serve/adults/health-care/mental-health/resources/crisis-contacts.jsp

## 2022-10-05 NOTE — PATIENT INSTRUCTIONS
Preventive Health Recommendations  Female Ages 50 - 64    Yearly exam: See your health care provider every year in order to  o Review health changes.   o Discuss preventive care.    o Review your medicines if your doctor has prescribed any.      Get a Pap test every three years (unless you have an abnormal result and your provider advises testing more often).    If you get Pap tests with HPV test, you only need to test every 5 years, unless you have an abnormal result.     You do not need a Pap test if your uterus was removed (hysterectomy) and you have not had cancer.    You should be tested each year for STDs (sexually transmitted diseases) if you're at risk.     Have a mammogram every 1 to 2 years.    Have a colonoscopy at age 50, or have a yearly FIT test (stool test). These exams screen for colon cancer.      Have a cholesterol test every 5 years, or more often if advised.    Have a diabetes test (fasting glucose) every three years. If you are at risk for diabetes, you should have this test more often.     If you are at risk for osteoporosis (brittle bone disease), think about having a bone density scan (DEXA).    Shots: Get a flu shot each year. Get a tetanus shot every 10 years.    Nutrition:     Eat at least 5 servings of fruits and vegetables each day.    Eat whole-grain bread, whole-wheat pasta and brown rice instead of white grains and rice.    Get adequate Calcium and Vitamin D.     Lifestyle    Exercise at least 150 minutes a week (30 minutes a day, 5 days a week). This will help you control your weight and prevent disease.    Limit alcohol to one drink per day.    No smoking.     Wear sunscreen to prevent skin cancer.     See your dentist every six months for an exam and cleaning.    See your eye doctor every 1 to 2 years.    Lung Cancer Screening   Frequently Asked Questions  If you are at high-risk for lung cancer, getting screened with low-dose computed tomography (LDCT) every year can help save  your life. This handout offers answers to some of the most common questions about lung cancer screening. If you have other questions, please call [phone] ([phone]).     What is it?  Lung cancer screening uses special X-ray technology to create an image of your lung tissue. The exam is quick and easy and takes less than 10 seconds. We don t give you any medicine or use any needles. You can eat before and after the exam. You don t need to change your clothes as long as the clothing on your chest doesn t contain metal. But, you do need to be able to hold your breath for at least 6 seconds during the exam.    What is the goal of lung cancer screening?  The goal of lung cancer screening is to save lives. Many times, lung cancer is not found until a person starts having physical symptoms. Lung cancer screening can help detect lung cancer in the earliest stages when it may be easier to treat.    Who should be screened for lung cancer?  We suggest lung cancer screening for anyone who is at high-risk for lung cancer. You are in the high-risk group if you:      are between the ages of 55 and 79, and    have smoked at least 1 pack of cigarettes a day for 20 or more years, and    still smoke or have quit within the past 15 years.    However, if you have a new cough or shortness of breath, you should talk to your doctor before being screened.    Why does it matter if I have symptoms?  Certain symptoms can be a sign that you have a condition in your lungs that should be checked and treated by your doctor. These symptoms include fever, chest pain, a new or changing cough, shortness of breath that you have never felt before, coughing up blood or unexplained weight loss. Having any of these symptoms can greatly affect the results of lung cancer screening.       Should all smokers get an LDCT lung cancer screening exam?  It depends. Lung cancer screening is for a very specific group of men and women who have a  history of heavy smoking over a long period of time (see  Who should be screened for lung cancer  above).  I am in the high-risk group, but have been diagnosed with cancer in the past. Is LDCT lung cancer screening right for me?  In some cases, you should not have LDCT lung screening, such as when your doctor is already following your cancer with CT scan studies. Your doctor will help you decide if LDCT lung screening is right for you.  Do I need to have a screening exam every year?  Yes. If you are in the high-risk group described earlier, you should get an LDCT lung cancer screening exam every year until you are 79, or are no longer willing or able to undergo screening and possible procedures to diagnose and treat lung cancer.  How effective is LDCT at preventing death from lung cancer?  Studies have shown that LDCT lung cancer screening can lower the risk of death from lung cancer by 20 percent in people who are at high-risk.  What are the risks?  There are some risks and limitations of LDCT lung cancer screening. We want to make sure you understand the risks and benefits, so please let us know if you have any questions. Your doctor may want to talk with you more about these risks.    Radiation exposure: As with any exam that uses radiation, there is a very small increased risk of cancer. The amount of radiation in LDCT is small--about the same amount a person would get from a mammogram. Your doctor orders the exam when he or she feels the potential benefits outweigh the risks.    False negatives: No test is perfect, including LDCT. It is possible that you may have a medical condition, including lung cancer, that is not found during your exam. This is called a false negative result.    False positives and more testing: LDCT very often finds something in the lung that could be cancer, but in fact is not. This is called a false positive result. False positive tests often cause anxiety. To make sure these findings  are not cancer, you may need to have more tests. These tests will be done only if you give us permission. Sometimes patients need a treatment that can have side effects, such as a biopsy. For more information on false positives, see  What can I expect from the results?     Findings not related to lung cancer: Your LDCT exam also takes pictures of areas of your body next to your lungs. In a very small number of cases, the CT scan will show an abnormal finding in one of these areas, such as your kidneys, adrenal glands, liver or thyroid. This finding may not be serious, but you may need more tests. Your doctor can help you decide what other tests you may need, if any.  What can I expect from the results?  About 1 out of 4 LDCT exams will find something that may need more tests. Most of the time, these findings are lung nodules. Lung nodules are very small collections of tissue in the lung. These nodules are very common, and the vast majority--more than 97 percent--are not cancer (benign). Most are normal lymph nodes or small areas of scarring from past infections.  But, if a small lung nodule is found to be cancer, the cancer can be cured more than 90 percent of the time. To know if the nodule is cancer, we may need to get more images before your next yearly screening exam. If the nodule has suspicious features (for example, it is large, has an odd shape or grows over time), we will refer you to a specialist for further testing.  Will my doctor also get the results?  Yes. Your doctor will get a copy of your results.  Is it okay to keep smoking now that there s a cancer screening exam?  No. Tobacco is one of the strongest cancer-causing agents. It causes not only lung cancer, but other cancers and cardiovascular (heart) diseases as well. The damage caused by smoking builds over time. This means that the longer you smoke, the higher your risk of disease. While it is never too late to quit, the sooner you quit, the  better.  Where can I find help to quit smoking?  The best way to prevent lung cancer is to stop smoking. If you have already quit smoking, congratulations and keep it up! For help on quitting smoking, please call [facility] at 6-003-QUITNOW (1-493.327.7901) or the [employer] at 1-147.696.1136 to find local resources near you.  One-on-one health coaching:  If you d prefer to work individually with a health care provider on tobacco cessation, we offer:      Medication Therapy Management:  Our specially trained pharmacists work closely with you and your doctor to help you quit smoking.  Call [phone] or [phone] (toll free).

## 2022-10-07 LAB — BACTERIA SKIN AEROBE CULT: NO GROWTH

## 2022-10-24 ENCOUNTER — MYC REFILL (OUTPATIENT)
Dept: FAMILY MEDICINE | Facility: OTHER | Age: 57
End: 2022-10-24

## 2022-10-24 DIAGNOSIS — F41.1 GENERALIZED ANXIETY DISORDER: Primary | ICD-10-CM

## 2022-10-27 RX ORDER — HYDROXYZINE HYDROCHLORIDE 10 MG/1
10 TABLET, FILM COATED ORAL PRN
Qty: 90 TABLET | Refills: 1 | Status: SHIPPED | OUTPATIENT
Start: 2022-10-27 | End: 2023-12-12

## 2022-10-27 NOTE — TELEPHONE ENCOUNTER
"Pending Prescriptions:                       Disp   Refills    hydrOXYzine (ATARAX) 10 MG tablet                          Sig: Take 1 tablet (10 mg) by mouth as needed    Routing refill request to provider for review/approval because:  Medication is reported/historical    Requested Prescriptions   Pending Prescriptions Disp Refills    hydrOXYzine (ATARAX) 10 MG tablet       Sig: Take 1 tablet (10 mg) by mouth as needed       Antihistamines Protocol Passed - 10/24/2022 11:29 AM        Passed - Recent (12 mo) or future (30 days) visit within the authorizing provider's specialty     Patient has had an office visit with the authorizing provider or a provider within the authorizing providers department within the previous 12 mos or has a future within next 30 days. See \"Patient Info\" tab in inbasket, or \"Choose Columns\" in Meds & Orders section of the refill encounter.              Passed - Patient is age 3 or older     Apply age and/or weight-based dosing for peds patients age 3 and older.    Forward request to provider for patients under the age of 3.          Passed - Medication is active on med list                   "

## 2022-11-18 ENCOUNTER — MYC MEDICAL ADVICE (OUTPATIENT)
Dept: FAMILY MEDICINE | Facility: OTHER | Age: 57
End: 2022-11-18

## 2022-11-18 DIAGNOSIS — R32 URINARY INCONTINENCE, UNSPECIFIED TYPE: Primary | ICD-10-CM

## 2022-11-18 NOTE — TELEPHONE ENCOUNTER
Replied.       PAMELA Osborne CNP  Questions or concerns please feel free to send me a [employer] message or call me  Phone : [phone]

## 2022-11-21 ENCOUNTER — TELEPHONE (OUTPATIENT)
Dept: FAMILY MEDICINE | Facility: OTHER | Age: 57
End: 2022-11-21

## 2022-11-21 ENCOUNTER — LAB (OUTPATIENT)
Dept: LAB | Facility: CLINIC | Age: 57
End: 2022-11-21
Payer: COMMERCIAL

## 2022-11-21 DIAGNOSIS — R32 URINARY INCONTINENCE, UNSPECIFIED TYPE: ICD-10-CM

## 2022-11-21 DIAGNOSIS — R32 URINARY INCONTINENCE, UNSPECIFIED TYPE: Primary | ICD-10-CM

## 2022-11-21 LAB
ALBUMIN UR-MCNC: NEGATIVE MG/DL
APPEARANCE UR: CLEAR
BACTERIA #/AREA URNS HPF: ABNORMAL /HPF
BILIRUB UR QL STRIP: NEGATIVE
COLOR UR AUTO: YELLOW
GLUCOSE UR STRIP-MCNC: NEGATIVE MG/DL
HGB UR QL STRIP: NEGATIVE
KETONES UR STRIP-MCNC: ABNORMAL MG/DL
LEUKOCYTE ESTERASE UR QL STRIP: ABNORMAL
NITRATE UR QL: NEGATIVE
PH UR STRIP: 7 [PH] (ref 5–7)
RBC #/AREA URNS AUTO: ABNORMAL /HPF
SP GR UR STRIP: 1.02 (ref 1–1.03)
SQUAMOUS #/AREA URNS AUTO: ABNORMAL /LPF
UROBILINOGEN UR STRIP-ACNC: 0.2 E.U./DL
WBC #/AREA URNS AUTO: ABNORMAL /HPF

## 2022-11-21 PROCEDURE — 81001 URINALYSIS AUTO W/SCOPE: CPT

## 2022-11-21 PROCEDURE — 87086 URINE CULTURE/COLONY COUNT: CPT

## 2022-11-21 NOTE — TELEPHONE ENCOUNTER
Urine culture added.       PAMELA Osborne CNP  Questions or concerns please feel free to send me a [employer] message or call me  Phone : [phone]

## 2022-11-24 LAB — BACTERIA UR CULT: NORMAL

## 2022-12-12 ENCOUNTER — TELEPHONE (OUTPATIENT)
Dept: UROLOGY | Facility: CLINIC | Age: 57
End: 2022-12-12

## 2022-12-12 NOTE — TELEPHONE ENCOUNTER
Left 2 voicemails for patient to return call to Ruthie Mcintyre or Ruthie Bullock to reschedule appt.  12/9/22   12/ VM

## 2023-01-16 ENCOUNTER — OFFICE VISIT (OUTPATIENT)
Dept: FAMILY MEDICINE | Facility: CLINIC | Age: 58
End: 2023-01-16
Payer: COMMERCIAL

## 2023-01-16 VITALS
RESPIRATION RATE: 16 BRPM | BODY MASS INDEX: 27.05 KG/M2 | SYSTOLIC BLOOD PRESSURE: 146 MMHG | HEIGHT: 62 IN | TEMPERATURE: 98.2 F | DIASTOLIC BLOOD PRESSURE: 88 MMHG | WEIGHT: 147 LBS | HEART RATE: 85 BPM

## 2023-01-16 DIAGNOSIS — H01.119 EYELID DERMATITIS, ALLERGIC/CONTACT: Primary | ICD-10-CM

## 2023-01-16 PROCEDURE — 99212 OFFICE O/P EST SF 10 MIN: CPT | Performed by: PHYSICIAN ASSISTANT

## 2023-01-16 ASSESSMENT — PAIN SCALES - GENERAL: PAINLEVEL: NO PAIN (0)

## 2023-01-16 NOTE — PROGRESS NOTES
"  Assessment & Plan     Eyelid dermatitis, allergic/contact  Recommended using baby shampoo to remove eye make-up and to scrub her lid margins, she will very carefully apply hydrocortisone 1% over-the-counter twice a day avoiding contact with her eye if its not improving she will let me know and we can consider dermatology referral.          No follow-ups on file.    MAHESH Reeves [facility] QUINTEN Cuevas is a 57 year old, presenting for the following health issues:  No chief complaint on file.      HPI     Eyelid dryness - for 2 weeks.  Using saline gtts in her eyes.  Drinks a lot of water daily.  Some mild itching of eyes and lids.  Nothing new uses good facial products.  She now wonders if perhaps it is her make-up removing wipes that she uses it is causing problems.  Her eyes themselves do feel quite dry.  Saline drops have helped a little bit.  She does not have any mattering in her eyes.  She does not have this issue anywhere else.          Review of Systems   As documented above       Objective    BP (!) 146/88   Pulse 85   Temp 98.2  F (36.8  C) (Oral)   Resp 16   Ht 1.575 m (5' 2\")   Wt 66.7 kg (147 lb)   BMI 26.89 kg/m    Body mass index is 26.89 kg/m .  Physical Exam   GENERAL: healthy, alert and no distress  EYES: Eyes grossly normal to inspection now though patient does have eye make-up on, PERRL and conjunctivae and sclerae normal  EYES: She shows me a picture without make-up that shows erythema edema of her medial upper lids bilaterally right greater than left conjunctive are slightly injected though there is no matter bilaterally                    "

## 2023-03-19 ENCOUNTER — MYC MEDICAL ADVICE (OUTPATIENT)
Dept: FAMILY MEDICINE | Facility: OTHER | Age: 58
End: 2023-03-19
Payer: COMMERCIAL

## 2023-03-19 DIAGNOSIS — E78.5 HYPERLIPIDEMIA LDL GOAL <130: Primary | ICD-10-CM

## 2023-03-20 NOTE — TELEPHONE ENCOUNTER
Orders placed      PAMELA Osborne CNP  Questions or concerns please feel free to send me a [employer] message or call me  Phone : [phone]

## 2023-03-21 ENCOUNTER — LAB (OUTPATIENT)
Dept: LAB | Facility: CLINIC | Age: 58
End: 2023-03-21
Payer: COMMERCIAL

## 2023-03-21 DIAGNOSIS — E78.5 HYPERLIPIDEMIA LDL GOAL <130: ICD-10-CM

## 2023-03-21 LAB
CHOLEST SERPL-MCNC: 252 MG/DL
FASTING STATUS PATIENT QL REPORTED: YES
HDLC SERPL-MCNC: 80 MG/DL
LDLC SERPL CALC-MCNC: 155 MG/DL
NONHDLC SERPL-MCNC: 172 MG/DL
TRIGL SERPL-MCNC: 84 MG/DL

## 2023-03-21 PROCEDURE — 80061 LIPID PANEL: CPT

## 2023-03-21 PROCEDURE — 36415 COLL VENOUS BLD VENIPUNCTURE: CPT

## 2023-04-27 ENCOUNTER — MYC MEDICAL ADVICE (OUTPATIENT)
Dept: FAMILY MEDICINE | Facility: OTHER | Age: 58
End: 2023-04-27
Payer: COMMERCIAL

## 2023-04-27 ENCOUNTER — TELEPHONE (OUTPATIENT)
Dept: FAMILY MEDICINE | Facility: OTHER | Age: 58
End: 2023-04-27
Payer: COMMERCIAL

## 2023-04-27 DIAGNOSIS — F99 INSOMNIA DUE TO OTHER MENTAL DISORDER: Primary | ICD-10-CM

## 2023-04-27 DIAGNOSIS — F51.05 INSOMNIA DUE TO OTHER MENTAL DISORDER: Primary | ICD-10-CM

## 2023-04-27 DIAGNOSIS — F41.1 GENERALIZED ANXIETY DISORDER: ICD-10-CM

## 2023-04-27 RX ORDER — HYDROXYZINE PAMOATE 25 MG/1
25-50 CAPSULE ORAL
Qty: 90 CAPSULE | Refills: 1 | Status: SHIPPED | OUTPATIENT
Start: 2023-04-27 | End: 2023-09-27

## 2023-04-28 NOTE — TELEPHONE ENCOUNTER
Refill given, see note from OV that states that refill ok cause she did not need at annual exam.       PAMELA Osborne CNP  Questions or concerns please feel free to send me a [employer] message or call me  Phone : [phone]

## 2023-06-27 ENCOUNTER — MYC MEDICAL ADVICE (OUTPATIENT)
Dept: FAMILY MEDICINE | Facility: OTHER | Age: 58
End: 2023-06-27
Payer: COMMERCIAL

## 2023-06-27 ENCOUNTER — LAB (OUTPATIENT)
Dept: LAB | Facility: CLINIC | Age: 58
End: 2023-06-27
Payer: COMMERCIAL

## 2023-06-27 DIAGNOSIS — E78.5 HYPERLIPIDEMIA LDL GOAL <130: ICD-10-CM

## 2023-06-27 DIAGNOSIS — E78.5 HYPERLIPIDEMIA LDL GOAL <130: Primary | ICD-10-CM

## 2023-06-27 LAB
CHOLEST SERPL-MCNC: 272 MG/DL
HDLC SERPL-MCNC: 84 MG/DL
LDLC SERPL CALC-MCNC: 175 MG/DL
NONHDLC SERPL-MCNC: 188 MG/DL
TRIGL SERPL-MCNC: 67 MG/DL

## 2023-06-27 PROCEDURE — 80061 LIPID PANEL: CPT

## 2023-06-27 PROCEDURE — 36415 COLL VENOUS BLD VENIPUNCTURE: CPT

## 2023-06-28 ENCOUNTER — TELEPHONE (OUTPATIENT)
Dept: FAMILY MEDICINE | Facility: OTHER | Age: 58
End: 2023-06-28
Payer: COMMERCIAL

## 2023-06-28 DIAGNOSIS — E78.5 HYPERLIPIDEMIA LDL GOAL <130: Primary | ICD-10-CM

## 2023-06-28 RX ORDER — ATORVASTATIN CALCIUM 10 MG/1
10 TABLET, FILM COATED ORAL DAILY
Qty: 90 TABLET | Refills: 1 | Status: SHIPPED | OUTPATIENT
Start: 2023-06-28 | End: 2024-04-08

## 2023-06-28 NOTE — TELEPHONE ENCOUNTER
See lipid results and mychart    The 10-year ASCVD risk score (Inga LLOYD, et al., 2019) is: 6.7%    Values used to calculate the score:      Age: 57 years      Sex: Female      Is Non- : No      Diabetic: No      Tobacco smoker: Yes      Systolic Blood Pressure: 146 mmHg      Is BP treated: No      HDL Cholesterol: 84 mg/dL      Total Cholesterol: 272 mg/dL        PAMELA Osborne CNP  Questions or concerns please feel free to send me a [employer] message or call me  Phone : [phone]

## 2023-09-27 ENCOUNTER — MYC MEDICAL ADVICE (OUTPATIENT)
Dept: FAMILY MEDICINE | Facility: OTHER | Age: 58
End: 2023-09-27
Payer: COMMERCIAL

## 2023-09-27 DIAGNOSIS — F41.1 GENERALIZED ANXIETY DISORDER: ICD-10-CM

## 2023-09-27 DIAGNOSIS — F51.05 INSOMNIA DUE TO OTHER MENTAL DISORDER: ICD-10-CM

## 2023-09-27 DIAGNOSIS — F99 INSOMNIA DUE TO OTHER MENTAL DISORDER: ICD-10-CM

## 2023-09-27 RX ORDER — HYDROXYZINE PAMOATE 25 MG/1
25-50 CAPSULE ORAL
Qty: 90 CAPSULE | Refills: 1 | Status: SHIPPED | OUTPATIENT
Start: 2023-09-27 | End: 2024-03-19

## 2023-12-04 ENCOUNTER — OFFICE VISIT (OUTPATIENT)
Dept: FAMILY MEDICINE | Facility: CLINIC | Age: 58
End: 2023-12-04
Payer: COMMERCIAL

## 2023-12-04 VITALS
HEART RATE: 74 BPM | HEIGHT: 62 IN | DIASTOLIC BLOOD PRESSURE: 86 MMHG | SYSTOLIC BLOOD PRESSURE: 148 MMHG | OXYGEN SATURATION: 98 % | WEIGHT: 132 LBS | BODY MASS INDEX: 24.29 KG/M2 | RESPIRATION RATE: 16 BRPM | TEMPERATURE: 98 F

## 2023-12-04 DIAGNOSIS — S69.91XA INJURY OF FINGER OF RIGHT HAND, INITIAL ENCOUNTER: Primary | ICD-10-CM

## 2023-12-04 PROCEDURE — 99213 OFFICE O/P EST LOW 20 MIN: CPT | Performed by: PHYSICIAN ASSISTANT

## 2023-12-04 ASSESSMENT — ANXIETY QUESTIONNAIRES
6. BECOMING EASILY ANNOYED OR IRRITABLE: SEVERAL DAYS
7. FEELING AFRAID AS IF SOMETHING AWFUL MIGHT HAPPEN: NOT AT ALL
IF YOU CHECKED OFF ANY PROBLEMS ON THIS QUESTIONNAIRE, HOW DIFFICULT HAVE THESE PROBLEMS MADE IT FOR YOU TO DO YOUR WORK, TAKE CARE OF THINGS AT HOME, OR GET ALONG WITH OTHER PEOPLE: NOT DIFFICULT AT ALL
5. BEING SO RESTLESS THAT IT IS HARD TO SIT STILL: NOT AT ALL
GAD7 TOTAL SCORE: 5
3. WORRYING TOO MUCH ABOUT DIFFERENT THINGS: SEVERAL DAYS
2. NOT BEING ABLE TO STOP OR CONTROL WORRYING: SEVERAL DAYS
GAD7 TOTAL SCORE: 5
1. FEELING NERVOUS, ANXIOUS, OR ON EDGE: SEVERAL DAYS

## 2023-12-04 ASSESSMENT — PATIENT HEALTH QUESTIONNAIRE - PHQ9
5. POOR APPETITE OR OVEREATING: SEVERAL DAYS
SUM OF ALL RESPONSES TO PHQ QUESTIONS 1-9: 2

## 2023-12-04 ASSESSMENT — PAIN SCALES - GENERAL: PAINLEVEL: MODERATE PAIN (5)

## 2023-12-04 NOTE — PROGRESS NOTES
Assessment & Plan     Injury of finger of right hand, initial encounter  She will alternate heat and ice, kiesha tape for support after work hours and over night, OT if not improving     BP elevated, no history of HTN will recheck if still elevated recommend recheck bp in 2 weeks  Nicotine/Tobacco Cessation:  She reports that she has been smoking cigarettes. She started smoking about 44 years ago. She has a 11.00 pack-year smoking history. She has been exposed to tobacco smoke. She has never used smokeless tobacco.  Nicotine/Tobacco Cessation Plan: not discussed with pt today        MAHESH Reeves [facility] QUINTEN Cuevas is a 58 year old, presenting for the following health issues:  Finger      12/4/2023     1:21 PM   Additional Questions   Roomed by KHAI BROWNING       Pain History:  When did you first notice your pain? November 22nd   Have you seen anyone else for your pain? No  How has your pain affected your ability to work? Starting to feel it at work, she is a  and uses her index finger continuously throughout the day.  She is right-hand dominant.  Where in your body do you have pain? Musculoskeletal problem/pain  Description  Location: fingers - right  Joint Swelling: YES  Redness: No  Pain: YES, with certain movements such as flexion and extension of her finger at the MCP joint  Warmth: No  Intensity:  5/10  Progression of Symptoms:  worsening  Accompanying signs and symptoms:   Fevers: No  Numbness/tingling/weakness: No  History  Trauma to the area: No, however on November 22, 2023 as she was repeat open a box at work with her hand she felt a pull in between her right index and third fingers.  She felt a pull or snap.  It has been sore ever since.  She fears that it may worsen and she just wanted to have it evaluated today.  Recent illness:  No  Previous similar problem: No  Previous evaluation:  No  Precipitating or alleviating factors:  Aggravating factors  "include: flexion and extension of her index finger  Therapies tried and outcome: support wrap and Ibuprofen            Review of Systems   Constitutional, HEENT, cardiovascular, pulmonary, gi and gu systems are negative, except as otherwise noted.      Objective    BP (!) 158/96 (BP Location: Left arm, Patient Position: Chair, Cuff Size: Adult Regular)   Pulse 74   Temp 98  F (36.7  C) (Temporal)   Resp 16   Ht 1.575 m (5' 2\")   Wt 59.9 kg (132 lb)   LMP  (Exact Date)   SpO2 98%   Breastfeeding No   BMI 24.14 kg/m    Body mass index is 24.14 kg/m .  Physical Exam   GENERAL: healthy, alert and no distress  MS- right hand- Does not reveal any erythema, edema or gross deformity.  She is tender to palpation over the radial aspect of her index finger  just distal to the MCP joint, no other tenderness sensation is intact to light touch distally, she has good finger strength and normal range of motion but extreme flexion and extension is uncomfortable                      "

## 2023-12-05 ENCOUNTER — OFFICE VISIT (OUTPATIENT)
Dept: FAMILY MEDICINE | Facility: CLINIC | Age: 58
End: 2023-12-05
Payer: COMMERCIAL

## 2023-12-05 DIAGNOSIS — G44.209 TENSION HEADACHE: ICD-10-CM

## 2023-12-05 DIAGNOSIS — S09.90XA INJURY OF HEAD, INITIAL ENCOUNTER: ICD-10-CM

## 2023-12-05 DIAGNOSIS — M54.2 NECK PAIN: Primary | ICD-10-CM

## 2023-12-05 PROCEDURE — 99214 OFFICE O/P EST MOD 30 MIN: CPT | Performed by: PHYSICIAN ASSISTANT

## 2023-12-05 RX ORDER — IBUPROFEN 200 MG
800 TABLET ORAL ONCE
Status: COMPLETED | OUTPATIENT
Start: 2023-12-05 | End: 2023-12-05

## 2023-12-05 RX ADMIN — Medication 800 MG: at 11:29

## 2023-12-05 NOTE — LETTER
December 6, 2023      Faith Bates  [address] [facility] [address]        To Whom It May Concern:    Faith Bates  was seen on 12-5-2023.  Please excuse her from work on 12-5-2023 due to injury for the hours of 1pm-6pm.  Due to her pain, I recommended that she go home.      Sincerely,        Christina Trujillo PA-C

## 2023-12-05 NOTE — LETTER
December 6, 2023      Faith Bates  [address] [place] [id number]        To Whom It May Concern:    Faith Bates  was seen on 12/5/2023.  Please excuse her from work today at 3:30 pm due to work related injury and pain.      Sincerely,        Christina Trujillo PA-C
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
## 2023-12-12 ENCOUNTER — ANCILLARY PROCEDURE (OUTPATIENT)
Dept: GENERAL RADIOLOGY | Facility: CLINIC | Age: 58
End: 2023-12-12
Attending: PHYSICIAN ASSISTANT
Payer: OTHER MISCELLANEOUS

## 2023-12-12 ENCOUNTER — MYC MEDICAL ADVICE (OUTPATIENT)
Dept: FAMILY MEDICINE | Facility: CLINIC | Age: 58
End: 2023-12-12
Payer: COMMERCIAL

## 2023-12-12 ENCOUNTER — OFFICE VISIT (OUTPATIENT)
Dept: FAMILY MEDICINE | Facility: CLINIC | Age: 58
End: 2023-12-12
Payer: OTHER MISCELLANEOUS

## 2023-12-12 VITALS
SYSTOLIC BLOOD PRESSURE: 162 MMHG | HEART RATE: 104 BPM | OXYGEN SATURATION: 99 % | HEIGHT: 62 IN | WEIGHT: 132 LBS | TEMPERATURE: 98.4 F | BODY MASS INDEX: 24.29 KG/M2 | RESPIRATION RATE: 17 BRPM | DIASTOLIC BLOOD PRESSURE: 92 MMHG

## 2023-12-12 DIAGNOSIS — M62.838 MUSCLE SPASM: ICD-10-CM

## 2023-12-12 DIAGNOSIS — M54.2 NECK PAIN: Primary | ICD-10-CM

## 2023-12-12 DIAGNOSIS — M54.2 NECK PAIN: ICD-10-CM

## 2023-12-12 PROCEDURE — 99213 OFFICE O/P EST LOW 20 MIN: CPT | Performed by: PHYSICIAN ASSISTANT

## 2023-12-12 PROCEDURE — 72040 X-RAY EXAM NECK SPINE 2-3 VW: CPT | Mod: TC | Performed by: RADIOLOGY

## 2023-12-12 RX ORDER — METHOCARBAMOL 750 MG/1
750 TABLET, FILM COATED ORAL 4 TIMES DAILY PRN
Qty: 30 TABLET | Refills: 1 | Status: SHIPPED | OUTPATIENT
Start: 2023-12-12 | End: 2023-12-12

## 2023-12-12 RX ORDER — METHOCARBAMOL 750 MG/1
750 TABLET, FILM COATED ORAL 4 TIMES DAILY PRN
Qty: 30 TABLET | Refills: 1 | Status: SHIPPED | OUTPATIENT
Start: 2023-12-12 | End: 2024-09-04

## 2023-12-12 ASSESSMENT — PAIN SCALES - GENERAL: PAINLEVEL: EXTREME PAIN (9)
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
## 2023-12-12 NOTE — LETTER
REPORT OF WORK COMP    [facility] [place]  [address]  [place] [id number] [phone]      PATIENT DATA    Employee Name: Faith Bates      : 1965     #: [id number]    Work related injury: Yes  Employer at time of injury: [employer]  Employer contact & phone: Marleny Fraser  Employed elsewhere? No  Workers' Compensation Carrier/Managed Care Plan:  unknown    Today's date: 2023  Date of injury: 2023  Date of first visit: 2023    PROVIDER EVALUATION: Please fill in as needed.  Please give copy to employee for employer.    1. Diagnosis: head injury, neck strain, headache    2. Treatment: ice, heat, rest .  3. Medication: ibuprofen 800 mg tid with food, now adding methocarbamol   NOTE: When ordering a medication, [place] Rules require Work Comp or WC on prescriptions.    4. No work from 2023 around 1 pm or until coverage is found today  to .  5. Return to work date: will reassess at our visit on 23   ** WITH RESTRICTIONS? Would anticipate need for restrictions which we will determine on 23      RESTRICTIONS: Unlimited unless listed.  Restrictions apply to home and leisure also.  If work restrictions is not available, the employee is totally disabled.    Maximum Medical Improvement (Date): unknown  Any Permanent Partial Disability? Deferred to future exam/consult.    Provider comments: headache is better    Medical Examiner: Christina Trujillo PA-C           License or registration: [id number]    Next appointment: 2 days    CC: Employer, Managed Care Plan/Payor, Patient

## 2023-12-12 NOTE — LETTER
December 12, 2023      Faith Bates  [address] [facility] [address]        To Whom It May Concern:    Please excuse Faith Bates on 12-8-23 from 12:00-5:30 due to her injury.      Sincerely,        Christina Trujillo PA-C

## 2023-12-14 ENCOUNTER — MYC MEDICAL ADVICE (OUTPATIENT)
Dept: FAMILY MEDICINE | Facility: CLINIC | Age: 58
End: 2023-12-14

## 2023-12-14 ENCOUNTER — VIRTUAL VISIT (OUTPATIENT)
Dept: FAMILY MEDICINE | Facility: CLINIC | Age: 58
End: 2023-12-14
Payer: COMMERCIAL

## 2023-12-14 DIAGNOSIS — U07.1 INFECTION DUE TO 2019 NOVEL CORONAVIRUS: Primary | ICD-10-CM

## 2023-12-14 DIAGNOSIS — M62.838 MUSCLE SPASM: ICD-10-CM

## 2023-12-14 DIAGNOSIS — M54.2 NECK PAIN: ICD-10-CM

## 2023-12-14 PROCEDURE — 99213 OFFICE O/P EST LOW 20 MIN: CPT | Mod: VID | Performed by: PHYSICIAN ASSISTANT

## 2023-12-14 NOTE — LETTER
December 14, 2023      Faith Bates  [address] [facility] [address]        To Whom It May Concern:    Faith Bates  was seen on December 14, 2023 .  Please excuse her from work due to COVID, we did not assess her neck injury due to her current illness.  We will reassess her once she is healthy and able to return to work from a COVID standpoint.     Sincerely,        Christina Trujillo PA-C

## 2023-12-14 NOTE — PROGRESS NOTES
"    Instructions Relayed to Patient by Virtual Roomer:     Patient is active on [employer]:   Relayed following to patient: \"It looks like you are active on [employer], are you able to join the visit this way? If not, do you need us to send you a link now or would you like your provider to send a link via text or email when they are ready to initiate the visit?\"    Reminded patient to ensure they were logged on to virtual visit by arrival time listed. Documented in appointment notes if patient had flexibility to initiate visit sooner than arrival time. If pediatric virtual visit, ensured pediatric patient along with parent/guardian will be present for video visit.     Patient offered the website [email or web address] and/or phone number to [employer] Help line: [phone]      Answers submitted by the patient for this visit:  General Questionnaire (Submitted on 12/12/2023)  Chief Complaint: Chronic problems general questions HPI Form  What is the reason for your visit today? : Follow up  How many servings of fruits and vegetables do you eat daily?: 0-1  On average, how many sweetened beverages do you drink each day (Examples: soda, juice, sweet tea, etc.  Do NOT count diet or artificially sweetened beverages)?: 0  How many minutes a day do you exercise enough to make your heart beat faster?: 10 to 19  How many days a week do you exercise enough to make your heart beat faster?: 3 or less  How many days per week do you miss taking your medication?: 3  What makes it hard for you to take your medication every day?: remembering to take  Faith is a 58 year old who is being evaluated via a billable video visit.      How would you like to obtain your AVS? [email or web address]  If the video visit is dropped, the invitation should be resent by: Text to cell phone: [phone]  Will anyone else be joining your video visit? No          Assessment & Plan     Infection due to 2019 novel coronavirus  Patient declined Paxlovid, she is " waiting to hear from occupational health and further instructions she is aware she can take Paxlovid up to 5 days from when her symptoms began    Neck pain  Too difficult to assess with her current body aches and pains caused by COVID, we will reassess when she returns to work    Muscle spasm  As above      Christina Trujillo PA-C  [facility] [place] Cuevas is a 58 year old, presenting for the following health issues:  Neck Pain      History of Present Illness       Reason for visit:  Follow up    She eats 0-1 servings of fruits and vegetables daily.She consumes 0 sweetened beverage(s) daily.She exercises with enough effort to increase her heart rate 10 to 19 minutes per day.  She exercises with enough effort to increase her heart rate 3 or less days per week.   She is not taking prescribed medications regularly due to remembering to take.     She reports she is positive for COVID as of this morning.  2 days ago she started with chills and bodyaches all over when she returned home from work.  She believes that her neck pain was somewhat worse at work 2 days ago because of her evolving infection with COVID because the day before it had been better.  She does not have chest pains or shortness of breath.  She is in touch with occupational health.      Review of Systems   As documented above       Objective           Vitals:  No vitals were obtained today due to virtual visit.    Physical Exam   GENERAL: Healthy, alert and no distress  EYES: Eyes grossly normal to inspection.  No discharge or erythema, or obvious scleral/conjunctival abnormalities.  RESP: Intermittent cough  SKIN: Visible skin clear. No significant rash, abnormal pigmentation or lesions.  NEURO: Cranial nerves grossly intact.  Mentation and speech appropriate for age.  PSYCH: Mentation appears normal, affect normal/bright, judgement and insight intact, normal speech and appearance well-groomed.                Video-Visit  Details    Type of service:  Video Visit     Originating Location (pt. Location): Home    Distant Location (provider location):  Off-site  Platform used for Video Visit: Reji

## 2023-12-27 ENCOUNTER — OFFICE VISIT (OUTPATIENT)
Dept: FAMILY MEDICINE | Facility: CLINIC | Age: 58
End: 2023-12-27
Payer: OTHER MISCELLANEOUS

## 2023-12-27 VITALS
OXYGEN SATURATION: 98 % | RESPIRATION RATE: 18 BRPM | WEIGHT: 132 LBS | HEART RATE: 87 BPM | HEIGHT: 62 IN | DIASTOLIC BLOOD PRESSURE: 84 MMHG | SYSTOLIC BLOOD PRESSURE: 136 MMHG | TEMPERATURE: 98.7 F | BODY MASS INDEX: 24.29 KG/M2

## 2023-12-27 DIAGNOSIS — M79.641 PAIN OF RIGHT HAND: ICD-10-CM

## 2023-12-27 DIAGNOSIS — M25.511 ACUTE PAIN OF BOTH SHOULDERS: ICD-10-CM

## 2023-12-27 DIAGNOSIS — M54.2 CERVICALGIA: Primary | ICD-10-CM

## 2023-12-27 DIAGNOSIS — M25.512 ACUTE PAIN OF BOTH SHOULDERS: ICD-10-CM

## 2023-12-27 PROCEDURE — 99213 OFFICE O/P EST LOW 20 MIN: CPT | Performed by: FAMILY MEDICINE

## 2023-12-27 ASSESSMENT — PAIN SCALES - GENERAL: PAINLEVEL: NO PAIN (0)

## 2023-12-27 NOTE — LETTER
REPORT OF WORK [facility] [place]  [address], [address]  [place] [id number] [phone]      PATIENT DATA    Employee Name: Faith Bates      : 1965     #: [id number]    Work related injury: Yes  Employer at time of injury: [employer]    Today's date: 2023  Date of injury: 2023 and 2023  Date of first visit: 2023 for right fingers and 2023 for neck and shoulder injuries.    Reached maximal medical improvement. May return to work without restrictions.     Medical Examiner: Teja Gonzalez MD           [id number]    Next appointment: As needed, do not foresee future appointments being necessary.    CC: Employer, Managed Care Plan/Payor, Patient

## 2023-12-27 NOTE — PROGRESS NOTES
"Assessment & Plan   1. Cervicalgia  2. Acute pain of both shoulders  3. Pain of right hand  Resolved. Letter written. Return to full duty.      Teja Gonzalez MD  [facility]    Disclaimer: This note consists of symbols derived from keyboarding, dictation and/or voice recognition software. As a result, there may be errors in the script that have gone undetected. Please consider this when interpreting information found in this chart.    Ritu Cuevas is a 58 year old, presenting for the following health issues:  Work Comp      12/27/2023    11:38 AM   Additional Questions   Roomed by Jadyn Randolph CMA   Accompanied by self         12/27/2023    11:38 AM   Patient Reported Additional Medications   Patient reports taking the following new medications none     History of Present Illness       Reason for visit:  Injury  Symptom onset:  1-2 weeks ago    She eats 2-3 servings of fruits and vegetables daily.She consumes 0 sweetened beverage(s) daily.She exercises with enough effort to increase her heart rate 9 or less minutes per day.  She is missing 4 dose(s) of medications per week.     Letter stating she is no longer under restrictions and can return to work following injuries on 11/22/23 (right fingers 1-3) and 12/5/23 (neck and shoulder).    No further pain or limited range of motion.    Review of Systems   Constitutional, HEENT, cardiovascular, pulmonary, gi and gu systems are negative, except as otherwise noted.      Objective    /84   Pulse 87   Temp 98.7  F (37.1  C) (Temporal)   Resp 18   Ht 1.575 m (5' 2.01\")   Wt 59.9 kg (132 lb)   LMP  (LMP Unknown)   SpO2 98%   Breastfeeding No   BMI 24.14 kg/m    Body mass index is 24.14 kg/m .  Physical Exam   General: Appears well and in no acute distress.  Musculoskeletal: No gross extremity deformities. No peripheral edema. Normal muscle bulk. No cervical tenderness. Normal range of motion. Normal bilateral shoulder " exam.    Labs: none

## 2023-12-30 ASSESSMENT — ENCOUNTER SYMPTOMS
CHILLS: 0
COUGH: 1
WEAKNESS: 0
NAUSEA: 0
HEARTBURN: 0
CONSTIPATION: 0
EYE PAIN: 0
MYALGIAS: 0
NERVOUS/ANXIOUS: 0
PARESTHESIAS: 0
BREAST MASS: 0
HEADACHES: 0
DIZZINESS: 0
JOINT SWELLING: 0
DYSURIA: 0
ARTHRALGIAS: 0
SORE THROAT: 0
PALPITATIONS: 0
HEMATOCHEZIA: 0
HEMATURIA: 0
ABDOMINAL PAIN: 0
FREQUENCY: 0
FEVER: 0
SHORTNESS OF BREATH: 0
DIARRHEA: 0

## 2023-12-31 ENCOUNTER — HEALTH MAINTENANCE LETTER (OUTPATIENT)
Age: 58
End: 2023-12-31

## 2024-01-03 ASSESSMENT — ANXIETY QUESTIONNAIRES
7. FEELING AFRAID AS IF SOMETHING AWFUL MIGHT HAPPEN: NOT AT ALL
4. TROUBLE RELAXING: MORE THAN HALF THE DAYS
5. BEING SO RESTLESS THAT IT IS HARD TO SIT STILL: NOT AT ALL
1. FEELING NERVOUS, ANXIOUS, OR ON EDGE: SEVERAL DAYS
IF YOU CHECKED OFF ANY PROBLEMS ON THIS QUESTIONNAIRE, HOW DIFFICULT HAVE THESE PROBLEMS MADE IT FOR YOU TO DO YOUR WORK, TAKE CARE OF THINGS AT HOME, OR GET ALONG WITH OTHER PEOPLE: NOT DIFFICULT AT ALL
8. IF YOU CHECKED OFF ANY PROBLEMS, HOW DIFFICULT HAVE THESE MADE IT FOR YOU TO DO YOUR WORK, TAKE CARE OF THINGS AT HOME, OR GET ALONG WITH OTHER PEOPLE?: NOT DIFFICULT AT ALL
3. WORRYING TOO MUCH ABOUT DIFFERENT THINGS: SEVERAL DAYS
6. BECOMING EASILY ANNOYED OR IRRITABLE: SEVERAL DAYS
7. FEELING AFRAID AS IF SOMETHING AWFUL MIGHT HAPPEN: NOT AT ALL
GAD7 TOTAL SCORE: 6
2. NOT BEING ABLE TO STOP OR CONTROL WORRYING: SEVERAL DAYS
GAD7 TOTAL SCORE: 6

## 2024-01-05 ENCOUNTER — OFFICE VISIT (OUTPATIENT)
Dept: FAMILY MEDICINE | Facility: OTHER | Age: 59
End: 2024-01-05
Payer: COMMERCIAL

## 2024-01-05 VITALS
WEIGHT: 131 LBS | DIASTOLIC BLOOD PRESSURE: 84 MMHG | HEART RATE: 87 BPM | SYSTOLIC BLOOD PRESSURE: 140 MMHG | TEMPERATURE: 98.3 F | BODY MASS INDEX: 24.11 KG/M2 | RESPIRATION RATE: 20 BRPM | HEIGHT: 62 IN | OXYGEN SATURATION: 98 %

## 2024-01-05 DIAGNOSIS — K58.9 IRRITABLE BOWEL SYNDROME, UNSPECIFIED TYPE: ICD-10-CM

## 2024-01-05 DIAGNOSIS — Z12.4 CERVICAL CANCER SCREENING: ICD-10-CM

## 2024-01-05 DIAGNOSIS — Z87.891 PERSONAL HISTORY OF TOBACCO USE: ICD-10-CM

## 2024-01-05 DIAGNOSIS — R19.8 ABNORMAL BOWEL HABITS: ICD-10-CM

## 2024-01-05 DIAGNOSIS — F33.1 MAJOR DEPRESSIVE DISORDER, RECURRENT EPISODE, MODERATE (H): ICD-10-CM

## 2024-01-05 DIAGNOSIS — Z12.31 VISIT FOR SCREENING MAMMOGRAM: ICD-10-CM

## 2024-01-05 DIAGNOSIS — Z11.59 NEED FOR HEPATITIS C SCREENING TEST: ICD-10-CM

## 2024-01-05 DIAGNOSIS — E78.5 HYPERLIPIDEMIA LDL GOAL <130: ICD-10-CM

## 2024-01-05 DIAGNOSIS — Z11.4 SCREENING FOR HIV (HUMAN IMMUNODEFICIENCY VIRUS): ICD-10-CM

## 2024-01-05 DIAGNOSIS — F11.21 OPIOID DEPENDENCE IN REMISSION (H): ICD-10-CM

## 2024-01-05 DIAGNOSIS — Z00.00 ROUTINE GENERAL MEDICAL EXAMINATION AT A HEALTH CARE FACILITY: Primary | ICD-10-CM

## 2024-01-05 DIAGNOSIS — Z13.1 SCREENING FOR DIABETES MELLITUS: ICD-10-CM

## 2024-01-05 PROBLEM — M77.11 LATERAL EPICONDYLITIS OF RIGHT ELBOW: Status: RESOLVED | Noted: 2022-03-23 | Resolved: 2024-01-05

## 2024-01-05 LAB
CHOLEST SERPL-MCNC: 259 MG/DL
FASTING STATUS PATIENT QL REPORTED: YES
FASTING STATUS PATIENT QL REPORTED: YES
GLUCOSE SERPL-MCNC: 112 MG/DL (ref 70–99)
HCV AB SERPL QL IA: NONREACTIVE
HDLC SERPL-MCNC: 73 MG/DL
HIV 1+2 AB+HIV1 P24 AG SERPL QL IA: NONREACTIVE
LDLC SERPL CALC-MCNC: 171 MG/DL
NONHDLC SERPL-MCNC: 186 MG/DL
TRIGL SERPL-MCNC: 76 MG/DL

## 2024-01-05 PROCEDURE — 36415 COLL VENOUS BLD VENIPUNCTURE: CPT | Performed by: NURSE PRACTITIONER

## 2024-01-05 PROCEDURE — 87389 HIV-1 AG W/HIV-1&-2 AB AG IA: CPT | Performed by: NURSE PRACTITIONER

## 2024-01-05 PROCEDURE — 99213 OFFICE O/P EST LOW 20 MIN: CPT | Mod: 25 | Performed by: NURSE PRACTITIONER

## 2024-01-05 PROCEDURE — 86803 HEPATITIS C AB TEST: CPT | Performed by: NURSE PRACTITIONER

## 2024-01-05 PROCEDURE — 96127 BRIEF EMOTIONAL/BEHAV ASSMT: CPT | Performed by: NURSE PRACTITIONER

## 2024-01-05 PROCEDURE — 80061 LIPID PANEL: CPT | Performed by: NURSE PRACTITIONER

## 2024-01-05 PROCEDURE — 99396 PREV VISIT EST AGE 40-64: CPT | Performed by: NURSE PRACTITIONER

## 2024-01-05 PROCEDURE — 87624 HPV HI-RISK TYP POOLED RSLT: CPT | Performed by: NURSE PRACTITIONER

## 2024-01-05 PROCEDURE — G0145 SCR C/V CYTO,THINLAYER,RESCR: HCPCS | Performed by: NURSE PRACTITIONER

## 2024-01-05 PROCEDURE — 82947 ASSAY GLUCOSE BLOOD QUANT: CPT | Performed by: NURSE PRACTITIONER

## 2024-01-05 ASSESSMENT — PAIN SCALES - GENERAL: PAINLEVEL: NO PAIN (0)

## 2024-01-05 ASSESSMENT — ENCOUNTER SYMPTOMS
SORE THROAT: 0
DIARRHEA: 0
HEMATURIA: 0
SHORTNESS OF BREATH: 0
HEADACHES: 0
HEARTBURN: 0
HEMATOCHEZIA: 0
COUGH: 1
CONSTIPATION: 0
CHILLS: 0
DIZZINESS: 0
WEAKNESS: 0
EYE PAIN: 0
FREQUENCY: 0
ARTHRALGIAS: 0
DYSURIA: 0
NERVOUS/ANXIOUS: 0
PARESTHESIAS: 0
FEVER: 0
JOINT SWELLING: 0
BREAST MASS: 0
NAUSEA: 0
ABDOMINAL PAIN: 0
MYALGIAS: 0
PALPITATIONS: 0

## 2024-01-05 ASSESSMENT — PATIENT HEALTH QUESTIONNAIRE - PHQ9
10. IF YOU CHECKED OFF ANY PROBLEMS, HOW DIFFICULT HAVE THESE PROBLEMS MADE IT FOR YOU TO DO YOUR WORK, TAKE CARE OF THINGS AT HOME, OR GET ALONG WITH OTHER PEOPLE: NOT DIFFICULT AT ALL
SUM OF ALL RESPONSES TO PHQ QUESTIONS 1-9: 2
SUM OF ALL RESPONSES TO PHQ QUESTIONS 1-9: 2

## 2024-01-05 NOTE — PROGRESS NOTES
SUBJECTIVE:   Faith is a 58 year old, presenting for the following:  Physical        1/5/2024     8:51 AM   Additional Questions   Roomed by Jennifer [place]       Healthy Habits:     Getting at least 3 servings of Calcium per day:  Yes    Bi-annual eye exam:  Yes    Dental care twice a year:  NO    Sleep apnea or symptoms of sleep apnea:  None    Diet:  Regular (no restrictions)    Frequency of exercise:  None    Taking medications regularly:  No    Barriers to taking medications:  Problems remembering to take them    Medication side effects:  None    Additional concerns today:  No      Today's PHQ-9 Score:       1/5/2024     8:41 AM   PHQ-9 SCORE   PHQ-9 Total Score MyChart 2 (Minimal depression)   PHQ-9 Total Score 2             Social History     Tobacco Use    Smoking status: Every Day     Packs/day: 0.50     Years: 22.00     Additional pack years: 0.00     Total pack years: 11.00     Types: Cigarettes     Start date: 1/1/1979     Passive exposure: Current    Smokeless tobacco: Never   Substance Use Topics    Alcohol use: Yes           12/30/2023     7:08 AM   Alcohol Use   Prescreen: >3 drinks/day or >7 drinks/week? Yes   AUDIT SCORE  12         12/30/2023     7:08 AM   AUDIT - Alcohol Use Disorders Identification Test - Reproduced from the World Health Organization Audit 2001 (Second Edition)   1.  How often do you have a drink containing alcohol? 4 or more times a week   2.  How many drinks containing alcohol do you have on a typical day when you are drinking? 1 or 2   3.  How often do you have five or more drinks on one occasion? Weekly   4.  How often during the last year have you found that you were not able to stop drinking once you had started? Less than monthly   5.  How often during the last year have you failed to do what was normally expected of you because of drinking? Less than monthly   6.  How often during the last year have you needed a first drink in the morning to get yourself going after a heavy  drinking session? Never   7.  How often during the last year have you had a feeling of guilt or remorse after drinking? Less than monthly   8.  How often during the last year have you been unable to remember what happened the night before because of your drinking? Never   9.  Have you or someone else been injured because of your drinking? No   10. Has a relative, friend, doctor or other health care worker been concerned about your drinking or suggested you cut down? Yes, but not in the last year   TOTAL SCORE 12     Reviewed orders with patient.  Reviewed health maintenance and updated orders accordingly - Yes  Lab work is in process    Breast Cancer Screenin/29/2022     3:14 PM   Breast CA Risk Assessment (FHS-7)   Do you have a family history of breast, colon, or ovarian cancer? No / Unknown       click delete button to remove this line now  Mammogram Screening: Recommended mammography every 1-2 years with patient discussion and risk factor consideration  Pertinent mammograms are reviewed under the imaging tab.    History of abnormal Pap smear: NO - age 30-65 PAP every 5 years with negative HPV co-testing recommended      2018    11:43 AM   PAP / HPV   PAP-ABSTRACT See Scanned Document           This result is from an external source.     Reviewed and updated as needed this visit by clinical staff   Tobacco  Allergies  Meds              Reviewed and updated as needed this visit by Provider                     Review of Systems   Constitutional:  Negative for chills and fever.   HENT:  Positive for congestion. Negative for ear pain, hearing loss and sore throat.    Eyes:  Negative for pain and visual disturbance.   Respiratory:  Positive for cough. Negative for shortness of breath.    Cardiovascular:  Positive for chest pain. Negative for palpitations and peripheral edema.   Gastrointestinal:  Negative for abdominal pain, constipation, diarrhea, heartburn, hematochezia and nausea.   Breasts:   "Negative for tenderness, breast mass and discharge.   Genitourinary:  Negative for dysuria, frequency, genital sores, hematuria, pelvic pain, urgency, vaginal bleeding and vaginal discharge.   Musculoskeletal:  Negative for arthralgias, joint swelling and myalgias.   Skin:  Negative for rash.   Neurological:  Negative for dizziness, weakness, headaches and paresthesias.   Psychiatric/Behavioral:  Negative for mood changes. The patient is not nervous/anxious.           OBJECTIVE:   BP (!) 148/74 (Cuff Size: Adult Regular)   Pulse 87   Temp 98.3  F (36.8  C) (Oral)   Resp 20   Ht 1.575 m (5' 2\")   Wt 59.4 kg (131 lb)   LMP  (LMP Unknown)   SpO2 98%   BMI 23.96 kg/m    Physical Exam  GENERAL: healthy, alert and no distress  EYES: Eyes grossly normal to inspection, PERRL and conjunctivae and sclerae normal  HENT: ear canals and TM's normal, nose and mouth without ulcers or lesions  NECK: no adenopathy, no asymmetry, masses, or scars and thyroid normal to palpation  RESP: lungs clear to auscultation - no rales, rhonchi or wheezes  BREAST: normal without masses, tenderness or nipple discharge and no palpable axillary masses or adenopathy  CV: regular rate and rhythm, normal S1 S2, no S3 or S4, no murmur, click or rub, no peripheral edema and peripheral pulses strong  ABDOMEN: soft, nontender, no hepatosplenomegaly, no masses and bowel sounds normal   (female): normal female external genitalia, normal urethral meatus, vaginal mucosa pink, moist, well rugated, and normal cervix/adnexa/uterus without masses or discharge  MS: no gross musculoskeletal defects noted, no edema  SKIN: no suspicious lesions or rashes  NEURO: Normal strength and tone, mentation intact and speech normal  PSYCH: mentation appears normal, affect normal/bright  LYMPH: no cervical, supraclavicular, axillary, or inguinal adenopathy    Diagnostic Test Results:  Labs reviewed in Epic  Results for orders placed or performed in visit on 01/05/24 "   Lipid panel reflex to direct LDL Fasting     Status: Abnormal   Result Value Ref Range    Cholesterol 259 (H) <200 mg/dL    Triglycerides 76 <150 mg/dL    Direct Measure HDL 73 >=50 mg/dL    LDL Cholesterol Calculated 171 (H) <=100 mg/dL    Non HDL Cholesterol 186 (H) <130 mg/dL    Patient Fasting > 8hrs? Yes     Narrative    Cholesterol  Desirable:  <200 mg/dL    Triglycerides  Normal:  Less than 150 mg/dL  Borderline High:  150-199 mg/dL  High:  200-499 mg/dL  Very High:  Greater than or equal to 500 mg/dL    Direct Measure HDL  Female:  Greater than or equal to 50 mg/dL   Male:  Greater than or equal to 40 mg/dL    LDL Cholesterol  Desirable:  <100mg/dL  Above Desirable:  100-129 mg/dL   Borderline High:  130-159 mg/dL   High:  160-189 mg/dL   Very High:  >= 190 mg/dL    Non HDL Cholesterol  Desirable:  130 mg/dL  Above Desirable:  130-159 mg/dL  Borderline High:  160-189 mg/dL  High:  190-219 mg/dL  Very High:  Greater than or equal to 220 mg/dL   HIV Antigen Antibody Combo     Status: Normal   Result Value Ref Range    HIV Antigen Antibody Combo Nonreactive Nonreactive   Hepatitis C Screen Reflex to HCV RNA Quant and Genotype     Status: Normal   Result Value Ref Range    Hepatitis C Antibody Nonreactive Nonreactive   Glucose     Status: Abnormal   Result Value Ref Range    Glucose 112 (H) 70 - 99 mg/dL    Patient Fasting > 8hrs? Yes    HPV High Risk Types DNA Cervical     Status: None   Result Value Ref Range    Other HR HPV Negative Negative    HPV16 DNA Negative Negative    HPV18 DNA Negative Negative    FINAL DIAGNOSIS       This patient's sample is negative for HPV DNA.        This test was developed and its performance characteristics determined by the [facility], [facility]. It has not been cleared or approved by the FDA. The laboratory is regulated under CLIA as qualified to perform high-complexity testing. This test is used for clinical purposes. It  should not be regarded as investigational or for research.    METHODOLOGY: The Roche Moon 4800 system uses automated extraction, simultaneous amplification of HPV (L1 region) and beta-globin, followed by real time detection of fluorescent labeled HPV and beta globin using specific oligonucleotide probes. The test specifically identifies types HPV 16 DNA and HPV 18 DNA while concurrently detecting the rest of the high risk types (31, 33, 35, 39, 45, 51, 52, 56, 58, 59, 66 or 68).    COMMENTS: This test is not intended for use as a screening device for woman under age 30 with normal cervical cytology. Results should be correlated with cytologic and histologic findings. Close clinical followup is recommended.       Pap Screen with HPV - recommended age 30 - 65 years     Status: None   Result Value Ref Range    Interpretation        Negative for Intraepithelial Lesion or Malignancy (NILM)    Comment         Papanicolaou Test Limitations:  Cervical cytology is a screening test with limited sensitivity, and regular screening is critical for cancer prevention.  Pap tests are primarily effective for the diagnosis/prevention of squamous cell carcinoma, not adenocarcinoma or other cancers.        Specimen Adequacy       Satisfactory for evaluation, endocervical/transformation zone component absent    Clinical Information       none      Reflex Testing Yes regardless of result     Previous Abnormal?       No      Performing Labs       The technical component of this testing was completed at [facility]         ASSESSMENT/PLAN:   (Z00.00) Routine general medical examination at a health care facility  (primary encounter diagnosis)  Comment: `  Plan: Updated     Plans to do Mammogram     (F11.21) Opioid dependence in remission (H)  Comment:   Plan: Continues to be in remission     (F33.1) Major depressive disorder, recurrent episode, moderate (H)  Comment:   Plan:  stable at this time   Continue to monitor  Score stable today   Atarax as needed for sleep and anxiety.   Ok for PRN refills, does not need today.       4/28/2023     7:05 AM 12/4/2023     2:01 PM 1/5/2024     8:41 AM   PHQ   PHQ-9 Total Score 3 2 2   Q9: Thoughts of better off dead/self-harm past 2 weeks Not at all Not at all Not at all       (Z12.4) Cervical cancer screening  Comment:   Plan: Pap Screen with HPV - recommended age 30 - 65         years, HPV Hold (Lab Only), HPV High Risk Types        DNA Cervical        If normal repeat 5 years     (R19.8) Abnormal bowel habits  Comment:   Plan: Fecal colorectal cancer screen (FIT)        Discussed IBS vs abnormal findings.   Not due for colonoscopy so recommend FIT test as a first step  See HPI   Level 3    (Z13.1) Screening for diabetes mellitus  Comment:   Plan: Glucose        Recommend A1C with abnormal Glucose     (Z87.891) Personal history of tobacco use  Comment:   Plan: Prof fee: Shared Decision Making for Lung         Cancer Screening, CT Chest Lung Cancer Scrn Low        Dose wo            (Z11.4) Screening for HIV (human immunodeficiency virus)  Comment:   Plan: HIV Antigen Antibody Combo            (Z11.59) Need for hepatitis C screening test  Comment:   Plan: Hepatitis C Screen Reflex to HCV RNA Quant and         Genotype            (Z12.31) Visit for screening mammogram  Comment:   Plan: *MA Screening Digital Bilateral, Glucose          (K58.9) Irritable bowel syndrome, unspecified type  Comment:   Plan: Has been able to manage for years without flares so it concerns her that the bowel changes are happening now.     (E78.5) Hyperlipidemia LDL goal <130  Comment:   Plan: Restart statin and then recheck lipids in 3-6 months.   Working on diet and exercise     Patient has been advised of split billing requirements and indicates understanding: Yes      COUNSELING:  Reviewed preventive health counseling, as reflected in patient instructions        She  reports that she has been smoking cigarettes. She started smoking about 45 years ago. She has a 11 pack-year smoking history. She has been exposed to tobacco smoke. She has never used smokeless tobacco.  Nicotine/Tobacco Cessation Plan:   Information offered: Patient not interested at this time      PAMELA Osborne CNP  [facility]  Answers submitted by the patient for this visit:  Patient Health Questionnaire (Submitted on 1/5/2024)  If you checked off any problems, how difficult have these problems made it for you to do your work, take care of things at home, or get along with other people?: Not difficult at all  PHQ9 TOTAL SCORE: 2  NOLBERTO-7 (Submitted on 1/3/2024)  NOLBERTO 7 TOTAL SCORE: 6  Lung Cancer Screening Shared Decision Making Visit     Faith Bates, a 58 year old female, is eligible for lung cancer screening    History   Smoking Status    Every Day    Packs/day: 0.50    Years: 22.00    Types: Cigarettes    Start date: 1/1/1979   Smokeless Tobacco    Never       I have discussed with patient the risks and benefits of screening for lung cancer with low-dose CT.     The risks include:    radiation exposure: one low dose chest CT has as much ionizing radiation as about 15 chest x-rays, or 6 months of background radiation living in [place]      false positives: most findings/nodules are NOT cancer, but some might still require additional diagnostic evaluation, including biopsy    over-diagnosis: some slow growing cancers that might never have been clinically significant will be detected and treated unnecessarily     The benefit of early detection of lung cancer is contingent upon adherence to annual screening or more frequent follow up if indicated.     Furthermore, to benefit from screening, Faith must be willing and able to undergo diagnostic procedures, if indicated. Although no specific guide is available for determining severity of comorbidities, it is reasonable to withhold  screening in patients who have greater mortality risk from other diseases.     We did discuss that the best way to prevent lung cancer is to not smoke.    Some patients may value a numeric estimation of lung cancer risk when evaluating if lung cancer screening is right for them, here is one calculator:    ShouldIScreen

## 2024-01-05 NOTE — PATIENT INSTRUCTIONS
Lung Cancer Screening   Frequently Asked Questions  If you are at high-risk for lung cancer, getting screened with low-dose computed tomography (LDCT) every year can help save your life. This handout offers answers to some of the most common questions about lung cancer screening. If you have other questions, please call [phone] ([phone]).     What is it?  Lung cancer screening uses special X-ray technology to create an image of your lung tissue. The exam is quick and easy and takes less than 10 seconds. We don t give you any medicine or use any needles. You can eat before and after the exam. You don t need to change your clothes as long as the clothing on your chest doesn t contain metal. But, you do need to be able to hold your breath for at least 6 seconds during the exam.    What is the goal of lung cancer screening?  The goal of lung cancer screening is to save lives. Many times, lung cancer is not found until a person starts having physical symptoms. Lung cancer screening can help detect lung cancer in the earliest stages when it may be easier to treat.    Who should be screened for lung cancer?  We suggest lung cancer screening for anyone who is at high-risk for lung cancer. You are in the high-risk group if you:      are between the ages of 55 and 79, and    have smoked at least 1 pack of cigarettes a day for 20 or more years, and    still smoke or have quit within the past 15 years.    However, if you have a new cough or shortness of breath, you should talk to your doctor before being screened.    Why does it matter if I have symptoms?  Certain symptoms can be a sign that you have a condition in your lungs that should be checked and treated by your doctor. These symptoms include fever, chest pain, a new or changing cough, shortness of breath that you have never felt before, coughing up blood or unexplained weight loss. Having any of these symptoms can greatly affect the results of lung  cancer screening.       Should all smokers get an LDCT lung cancer screening exam?  It depends. Lung cancer screening is for a very specific group of men and women who have a history of heavy smoking over a long period of time (see  Who should be screened for lung cancer  above).  I am in the high-risk group, but have been diagnosed with cancer in the past. Is LDCT lung cancer screening right for me?  In some cases, you should not have LDCT lung screening, such as when your doctor is already following your cancer with CT scan studies. Your doctor will help you decide if LDCT lung screening is right for you.  Do I need to have a screening exam every year?  Yes. If you are in the high-risk group described earlier, you should get an LDCT lung cancer screening exam every year until you are 79, or are no longer willing or able to undergo screening and possible procedures to diagnose and treat lung cancer.  How effective is LDCT at preventing death from lung cancer?  Studies have shown that LDCT lung cancer screening can lower the risk of death from lung cancer by 20 percent in people who are at high-risk.  What are the risks?  There are some risks and limitations of LDCT lung cancer screening. We want to make sure you understand the risks and benefits, so please let us know if you have any questions. Your doctor may want to talk with you more about these risks.    Radiation exposure: As with any exam that uses radiation, there is a very small increased risk of cancer. The amount of radiation in LDCT is small--about the same amount a person would get from a mammogram. Your doctor orders the exam when he or she feels the potential benefits outweigh the risks.    False negatives: No test is perfect, including LDCT. It is possible that you may have a medical condition, including lung cancer, that is not found during your exam. This is called a false negative result.    False positives and more testing: LDCT very often finds  something in the lung that could be cancer, but in fact is not. This is called a false positive result. False positive tests often cause anxiety. To make sure these findings are not cancer, you may need to have more tests. These tests will be done only if you give us permission. Sometimes patients need a treatment that can have side effects, such as a biopsy. For more information on false positives, see  What can I expect from the results?     Findings not related to lung cancer: Your LDCT exam also takes pictures of areas of your body next to your lungs. In a very small number of cases, the CT scan will show an abnormal finding in one of these areas, such as your kidneys, adrenal glands, liver or thyroid. This finding may not be serious, but you may need more tests. Your doctor can help you decide what other tests you may need, if any.  What can I expect from the results?  About 1 out of 4 LDCT exams will find something that may need more tests. Most of the time, these findings are lung nodules. Lung nodules are very small collections of tissue in the lung. These nodules are very common, and the vast majority--more than 97 percent--are not cancer (benign). Most are normal lymph nodes or small areas of scarring from past infections.  But, if a small lung nodule is found to be cancer, the cancer can be cured more than 90 percent of the time. To know if the nodule is cancer, we may need to get more images before your next yearly screening exam. If the nodule has suspicious features (for example, it is large, has an odd shape or grows over time), we will refer you to a specialist for further testing.  Will my doctor also get the results?  Yes. Your doctor will get a copy of your results.  Is it okay to keep smoking now that there s a cancer screening exam?  No. Tobacco is one of the strongest cancer-causing agents. It causes not only lung cancer, but other cancers and cardiovascular (heart) diseases as well. The damage  caused by smoking builds over time. This means that the longer you smoke, the higher your risk of disease. While it is never too late to quit, the sooner you quit, the better.  Where can I find help to quit smoking?  The best way to prevent lung cancer is to stop smoking. If you have already quit smoking, congratulations and keep it up! For help on quitting smoking, please call [employer] at 2-009-QUITNOW (1-733.408.8908) or the [employer] at 1-204.767.9588 to find local resources near you.  One-on-one health coaching:  If you d prefer to work individually with a health care provider on tobacco cessation, we offer:      Medication Therapy Management:  Our specially trained pharmacists work closely with you and your doctor to help you quit smoking.  Call [phone] or [phone] (toll free).    Preventive Health Recommendations  Female Ages 50 - 64    Yearly exam: See your health care provider every year in order to  Review health changes.   Discuss preventive care.    Review your medicines if your doctor has prescribed any.    Get a Pap test every three years (unless you have an abnormal result and your provider advises testing more often).  If you get Pap tests with HPV test, you only need to test every 5 years, unless you have an abnormal result.   You do not need a Pap test if your uterus was removed (hysterectomy) and you have not had cancer.  You should be tested each year for STDs (sexually transmitted diseases) if you're at risk.   Have a mammogram every 1 to 2 years.  Have a colonoscopy at age 45, or have a yearly FIT test (stool test). These exams screen for colon cancer.    Have a cholesterol test every 5 years, or more often if advised.  Have a diabetes test (fasting glucose) every three years. If you are at risk for diabetes, you should have this test more often.   If you are at risk for osteoporosis (brittle bone disease), think about having a bone density scan (DEXA).    Shots: Get a  flu shot each year. Get a tetanus shot every 10 years.    Nutrition:   Eat at least 5 servings of fruits and vegetables each day.  Eat whole-grain bread, whole-wheat pasta and brown rice instead of white grains and rice.  Get adequate Calcium and Vitamin D.     Lifestyle  Exercise at least 150 minutes a week (30 minutes a day, 5 days a week). This will help you control your weight and prevent disease.  Limit alcohol to one drink per day.  No smoking.   Wear sunscreen to prevent skin cancer.   See your dentist every six months for an exam and cleaning.  See your eye doctor every 1 to 2 years.

## 2024-01-10 ENCOUNTER — MYC MEDICAL ADVICE (OUTPATIENT)
Dept: FAMILY MEDICINE | Facility: OTHER | Age: 59
End: 2024-01-10
Payer: COMMERCIAL

## 2024-01-10 DIAGNOSIS — R73.01 IMPAIRED FASTING GLUCOSE: Primary | ICD-10-CM

## 2024-01-10 LAB
BKR LAB AP GYN ADEQUACY: NORMAL
BKR LAB AP GYN INTERPRETATION: NORMAL
BKR LAB AP HPV REFLEX: NORMAL
BKR LAB AP PREVIOUS ABNORMAL: NORMAL
PATH REPORT.COMMENTS IMP SPEC: NORMAL
PATH REPORT.COMMENTS IMP SPEC: NORMAL
PATH REPORT.RELEVANT HX SPEC: NORMAL

## 2024-01-11 LAB
HUMAN PAPILLOMA VIRUS 16 DNA: NEGATIVE
HUMAN PAPILLOMA VIRUS 18 DNA: NEGATIVE
HUMAN PAPILLOMA VIRUS FINAL DIAGNOSIS: NORMAL
HUMAN PAPILLOMA VIRUS OTHER HR: NEGATIVE

## 2024-02-13 ENCOUNTER — ALLIED HEALTH/NURSE VISIT (OUTPATIENT)
Dept: FAMILY MEDICINE | Facility: CLINIC | Age: 59
End: 2024-02-13
Payer: COMMERCIAL

## 2024-02-13 VITALS — SYSTOLIC BLOOD PRESSURE: 152 MMHG | DIASTOLIC BLOOD PRESSURE: 80 MMHG

## 2024-02-13 DIAGNOSIS — Z01.30 BLOOD PRESSURE CHECK: Primary | ICD-10-CM

## 2024-02-13 PROCEDURE — 99207 PR NO CHARGE NURSE ONLY: CPT

## 2024-02-13 NOTE — NURSING NOTE
Faith Bates is a 58 year old patient who comes in today for a Blood Pressure check.  Initial BP:  BP (!) 152/80 (BP Location: Left arm, Patient Position: Chair, Cuff Size: Adult Regular)   LMP  (LMP Unknown)        Disposition: results routed to provider   Pt declined triage in clinic would like B/P sent to provider.

## 2024-02-21 ENCOUNTER — MYC MEDICAL ADVICE (OUTPATIENT)
Dept: FAMILY MEDICINE | Facility: OTHER | Age: 59
End: 2024-02-21
Payer: COMMERCIAL

## 2024-02-21 DIAGNOSIS — I10 ESSENTIAL HYPERTENSION: Primary | ICD-10-CM

## 2024-02-22 RX ORDER — LISINOPRIL 5 MG/1
5 TABLET ORAL DAILY
Qty: 30 TABLET | Refills: 0 | Status: SHIPPED | OUTPATIENT
Start: 2024-02-22 | End: 2024-03-21

## 2024-03-07 ENCOUNTER — LAB (OUTPATIENT)
Dept: LAB | Facility: CLINIC | Age: 59
End: 2024-03-07
Payer: COMMERCIAL

## 2024-03-07 DIAGNOSIS — I10 ESSENTIAL HYPERTENSION: ICD-10-CM

## 2024-03-07 DIAGNOSIS — R73.01 IMPAIRED FASTING GLUCOSE: ICD-10-CM

## 2024-03-07 LAB
ANION GAP SERPL CALCULATED.3IONS-SCNC: 13 MMOL/L (ref 7–15)
BUN SERPL-MCNC: 12.9 MG/DL (ref 6–20)
CALCIUM SERPL-MCNC: 10.1 MG/DL (ref 8.6–10)
CHLORIDE SERPL-SCNC: 104 MMOL/L (ref 98–107)
CREAT SERPL-MCNC: 0.58 MG/DL (ref 0.51–0.95)
DEPRECATED HCO3 PLAS-SCNC: 24 MMOL/L (ref 22–29)
EGFRCR SERPLBLD CKD-EPI 2021: >90 ML/MIN/1.73M2
GLUCOSE SERPL-MCNC: 103 MG/DL (ref 70–99)
HBA1C MFR BLD: 5.6 % (ref 0–5.6)
POTASSIUM SERPL-SCNC: 4.5 MMOL/L (ref 3.4–5.3)
SODIUM SERPL-SCNC: 141 MMOL/L (ref 135–145)

## 2024-03-07 PROCEDURE — 36415 COLL VENOUS BLD VENIPUNCTURE: CPT

## 2024-03-07 PROCEDURE — 83036 HEMOGLOBIN GLYCOSYLATED A1C: CPT

## 2024-03-07 PROCEDURE — 80048 BASIC METABOLIC PNL TOTAL CA: CPT

## 2024-03-08 ENCOUNTER — MYC MEDICAL ADVICE (OUTPATIENT)
Dept: FAMILY MEDICINE | Facility: OTHER | Age: 59
End: 2024-03-08

## 2024-03-08 ENCOUNTER — ALLIED HEALTH/NURSE VISIT (OUTPATIENT)
Dept: FAMILY MEDICINE | Facility: CLINIC | Age: 59
End: 2024-03-08
Payer: COMMERCIAL

## 2024-03-08 VITALS — DIASTOLIC BLOOD PRESSURE: 82 MMHG | SYSTOLIC BLOOD PRESSURE: 136 MMHG

## 2024-03-08 DIAGNOSIS — Z01.30 BLOOD PRESSURE CHECK: Primary | ICD-10-CM

## 2024-03-08 PROCEDURE — 99207 PR NO CHARGE NURSE ONLY: CPT

## 2024-03-08 NOTE — TELEPHONE ENCOUNTER
See allied health visit 3/8/24.     Irlanda YIN, RN  [facility]

## 2024-03-08 NOTE — PROGRESS NOTES
Chief Complaint   Patient presents with    Allied Health Visit     Faith Bates is a 58 year old patient who comes in today for a Blood Pressure check.  Initial BP:  /82   LMP  (LMP Unknown)      Data Unavailable  Disposition: follow-up as previously indicated by provider, provider notified while patient in the clinic, and results routed to provider    Jadyn Randolph CMA (AAMA)

## 2024-03-19 ENCOUNTER — MYC REFILL (OUTPATIENT)
Dept: FAMILY MEDICINE | Facility: OTHER | Age: 59
End: 2024-03-19
Payer: COMMERCIAL

## 2024-03-19 DIAGNOSIS — F41.1 GENERALIZED ANXIETY DISORDER: ICD-10-CM

## 2024-03-19 DIAGNOSIS — F51.05 INSOMNIA DUE TO OTHER MENTAL DISORDER: ICD-10-CM

## 2024-03-19 DIAGNOSIS — F99 INSOMNIA DUE TO OTHER MENTAL DISORDER: ICD-10-CM

## 2024-03-19 RX ORDER — HYDROXYZINE PAMOATE 25 MG/1
25-50 CAPSULE ORAL
Qty: 90 CAPSULE | Refills: 1 | Status: SHIPPED | OUTPATIENT
Start: 2024-03-19

## 2024-03-21 DIAGNOSIS — I10 ESSENTIAL HYPERTENSION: ICD-10-CM

## 2024-03-21 RX ORDER — LISINOPRIL 5 MG/1
5 TABLET ORAL DAILY
Qty: 30 TABLET | Refills: 2 | Status: SHIPPED | OUTPATIENT
Start: 2024-03-21 | End: 2024-04-09

## 2024-04-07 DIAGNOSIS — E78.5 HYPERLIPIDEMIA LDL GOAL <130: ICD-10-CM

## 2024-04-08 RX ORDER — ATORVASTATIN CALCIUM 10 MG/1
10 TABLET, FILM COATED ORAL DAILY
Qty: 90 TABLET | Refills: 1 | Status: SHIPPED | OUTPATIENT
Start: 2024-04-08 | End: 2024-05-03

## 2024-04-09 ENCOUNTER — MYC MEDICAL ADVICE (OUTPATIENT)
Dept: FAMILY MEDICINE | Facility: OTHER | Age: 59
End: 2024-04-09
Payer: COMMERCIAL

## 2024-04-09 DIAGNOSIS — I10 ESSENTIAL HYPERTENSION: ICD-10-CM

## 2024-04-09 DIAGNOSIS — E78.5 HYPERLIPIDEMIA LDL GOAL <130: Primary | ICD-10-CM

## 2024-04-09 RX ORDER — LOSARTAN POTASSIUM 50 MG/1
50 TABLET ORAL DAILY
Qty: 90 TABLET | Refills: 0 | Status: SHIPPED | OUTPATIENT
Start: 2024-04-09 | End: 2024-05-03

## 2024-04-09 NOTE — TELEPHONE ENCOUNTER
Would you like patient to follow up with a visit either in person or virtual?    KD ForbesN, RN

## 2024-04-19 ENCOUNTER — ALLIED HEALTH/NURSE VISIT (OUTPATIENT)
Dept: FAMILY MEDICINE | Facility: CLINIC | Age: 59
End: 2024-04-19
Payer: COMMERCIAL

## 2024-04-19 VITALS — SYSTOLIC BLOOD PRESSURE: 130 MMHG | DIASTOLIC BLOOD PRESSURE: 72 MMHG

## 2024-04-19 DIAGNOSIS — I10 ESSENTIAL HYPERTENSION: Primary | ICD-10-CM

## 2024-04-19 PROCEDURE — 99207 PR NO CHARGE NURSE ONLY: CPT

## 2024-04-19 NOTE — NURSING NOTE
Faith Bates is a 58 year old patient who comes in today for a Blood Pressure check.  Initial BP:  /72 (BP Location: Right arm, Patient Position: Chair, Cuff Size: Adult Regular)   LMP  (LMP Unknown)        Disposition: results routed to provider    
Not applicable

## 2024-05-01 ENCOUNTER — LAB (OUTPATIENT)
Dept: LAB | Facility: CLINIC | Age: 59
End: 2024-05-01
Payer: COMMERCIAL

## 2024-05-01 DIAGNOSIS — E78.5 HYPERLIPIDEMIA LDL GOAL <130: ICD-10-CM

## 2024-05-01 PROCEDURE — 36415 COLL VENOUS BLD VENIPUNCTURE: CPT

## 2024-05-01 PROCEDURE — 80061 LIPID PANEL: CPT

## 2024-05-02 LAB
CHOLEST SERPL-MCNC: 230 MG/DL
FASTING STATUS PATIENT QL REPORTED: YES
HDLC SERPL-MCNC: 95 MG/DL
LDLC SERPL CALC-MCNC: 123 MG/DL
NONHDLC SERPL-MCNC: 135 MG/DL
TRIGL SERPL-MCNC: 62 MG/DL

## 2024-05-03 ENCOUNTER — TELEPHONE (OUTPATIENT)
Dept: FAMILY MEDICINE | Facility: OTHER | Age: 59
End: 2024-05-03
Payer: COMMERCIAL

## 2024-05-03 DIAGNOSIS — I10 ESSENTIAL HYPERTENSION: ICD-10-CM

## 2024-05-03 DIAGNOSIS — E78.5 HYPERLIPIDEMIA LDL GOAL <130: ICD-10-CM

## 2024-05-03 RX ORDER — ATORVASTATIN CALCIUM 10 MG/1
10 TABLET, FILM COATED ORAL DAILY
Qty: 90 TABLET | Refills: 3 | Status: SHIPPED | OUTPATIENT
Start: 2024-05-03

## 2024-05-03 RX ORDER — LOSARTAN POTASSIUM 50 MG/1
50 TABLET ORAL DAILY
Qty: 90 TABLET | Refills: 2 | Status: SHIPPED | OUTPATIENT
Start: 2024-05-03

## 2024-05-31 ENCOUNTER — MYC MEDICAL ADVICE (OUTPATIENT)
Dept: FAMILY MEDICINE | Facility: OTHER | Age: 59
End: 2024-05-31
Payer: COMMERCIAL

## 2024-05-31 DIAGNOSIS — N89.8 VAGINAL ITCHING: Primary | ICD-10-CM

## 2024-05-31 DIAGNOSIS — B96.89 BACTERIAL VAGINOSIS: ICD-10-CM

## 2024-05-31 DIAGNOSIS — N76.0 BACTERIAL VAGINOSIS: ICD-10-CM

## 2024-06-07 ENCOUNTER — LAB (OUTPATIENT)
Dept: LAB | Facility: OTHER | Age: 59
End: 2024-06-07
Payer: COMMERCIAL

## 2024-06-07 DIAGNOSIS — N89.8 VAGINAL ITCHING: ICD-10-CM

## 2024-06-07 LAB
CLUE CELLS: PRESENT
TRICHOMONAS, WET PREP: ABNORMAL
WBC'S/HIGH POWER FIELD, WET PREP: ABNORMAL
YEAST, WET PREP: ABNORMAL

## 2024-06-07 PROCEDURE — 87210 SMEAR WET MOUNT SALINE/INK: CPT

## 2024-06-07 RX ORDER — METRONIDAZOLE 7.5 MG/G
1 GEL VAGINAL DAILY
Qty: 25 G | Refills: 0 | Status: SHIPPED | OUTPATIENT
Start: 2024-06-07 | End: 2024-06-12

## 2024-07-27 ENCOUNTER — HEALTH MAINTENANCE LETTER (OUTPATIENT)
Age: 59
End: 2024-07-27

## 2024-09-03 ENCOUNTER — E-VISIT (OUTPATIENT)
Dept: FAMILY MEDICINE | Facility: OTHER | Age: 59
End: 2024-09-03
Payer: COMMERCIAL

## 2024-09-03 DIAGNOSIS — G47.00 INSOMNIA, UNSPECIFIED TYPE: Primary | ICD-10-CM

## 2024-09-03 PROCEDURE — 99421 OL DIG E/M SVC 5-10 MIN: CPT | Performed by: NURSE PRACTITIONER

## 2024-09-04 RX ORDER — TRAZODONE HYDROCHLORIDE 50 MG/1
50-100 TABLET, FILM COATED ORAL AT BEDTIME
Qty: 60 TABLET | Refills: 0 | Status: SHIPPED | OUTPATIENT
Start: 2024-09-04

## 2024-09-04 NOTE — PATIENT INSTRUCTIONS
Thank you for choosing us for your care. I have placed an order for a prescription so that you can start treatment. View your full visit summary for details by clicking on the link below. Your pharmacist will able to address any questions you may have about the medication.     If you're not feeling better within 5-7 days, please schedule an appointment.  You can schedule an appointment right here in [facility], or call [phone]  If the visit is for the same symptoms as your eVisit, we'll refund the cost of your eVisit if seen within seven days.

## 2024-10-08 DIAGNOSIS — G47.00 INSOMNIA, UNSPECIFIED TYPE: ICD-10-CM

## 2024-10-08 RX ORDER — TRAZODONE HYDROCHLORIDE 50 MG/1
50-100 TABLET, FILM COATED ORAL AT BEDTIME
Qty: 180 TABLET | Refills: 0 | Status: SHIPPED | OUTPATIENT
Start: 2024-10-08

## 2024-10-15 ENCOUNTER — TELEPHONE (OUTPATIENT)
Dept: FAMILY MEDICINE | Facility: CLINIC | Age: 59
End: 2024-10-15

## 2024-10-15 ENCOUNTER — LAB (OUTPATIENT)
Dept: LAB | Facility: CLINIC | Age: 59
End: 2024-10-15
Payer: COMMERCIAL

## 2024-10-15 DIAGNOSIS — N76.0 BACTERIAL VAGINOSIS: ICD-10-CM

## 2024-10-15 DIAGNOSIS — N94.9 VAGINAL SYMPTOM: Primary | ICD-10-CM

## 2024-10-15 DIAGNOSIS — B96.89 BACTERIAL VAGINOSIS: ICD-10-CM

## 2024-10-15 PROCEDURE — 87210 SMEAR WET MOUNT SALINE/INK: CPT

## 2024-10-15 RX ORDER — METRONIDAZOLE 7.5 MG/G
1 GEL VAGINAL DAILY
Qty: 70 G | Refills: 0 | Status: SHIPPED | OUTPATIENT
Start: 2024-10-15 | End: 2024-10-20

## 2024-12-12 ENCOUNTER — MYC REFILL (OUTPATIENT)
Dept: FAMILY MEDICINE | Facility: OTHER | Age: 59
End: 2024-12-12
Payer: COMMERCIAL

## 2024-12-12 DIAGNOSIS — G47.00 INSOMNIA, UNSPECIFIED TYPE: ICD-10-CM

## 2024-12-12 RX ORDER — TRAZODONE HYDROCHLORIDE 50 MG/1
50-100 TABLET, FILM COATED ORAL AT BEDTIME
Qty: 180 TABLET | Refills: 0 | Status: SHIPPED | OUTPATIENT
Start: 2024-12-12

## 2025-01-07 ASSESSMENT — ANXIETY QUESTIONNAIRES
GAD7 TOTAL SCORE: 7
GAD7 TOTAL SCORE: 7
3. WORRYING TOO MUCH ABOUT DIFFERENT THINGS: MORE THAN HALF THE DAYS
7. FEELING AFRAID AS IF SOMETHING AWFUL MIGHT HAPPEN: NOT AT ALL
6. BECOMING EASILY ANNOYED OR IRRITABLE: SEVERAL DAYS
GAD7 TOTAL SCORE: 7
8. IF YOU CHECKED OFF ANY PROBLEMS, HOW DIFFICULT HAVE THESE MADE IT FOR YOU TO DO YOUR WORK, TAKE CARE OF THINGS AT HOME, OR GET ALONG WITH OTHER PEOPLE?: SOMEWHAT DIFFICULT
5. BEING SO RESTLESS THAT IT IS HARD TO SIT STILL: NOT AT ALL
4. TROUBLE RELAXING: SEVERAL DAYS
IF YOU CHECKED OFF ANY PROBLEMS ON THIS QUESTIONNAIRE, HOW DIFFICULT HAVE THESE PROBLEMS MADE IT FOR YOU TO DO YOUR WORK, TAKE CARE OF THINGS AT HOME, OR GET ALONG WITH OTHER PEOPLE: SOMEWHAT DIFFICULT
2. NOT BEING ABLE TO STOP OR CONTROL WORRYING: MORE THAN HALF THE DAYS
7. FEELING AFRAID AS IF SOMETHING AWFUL MIGHT HAPPEN: NOT AT ALL
1. FEELING NERVOUS, ANXIOUS, OR ON EDGE: SEVERAL DAYS

## 2025-01-07 ASSESSMENT — PATIENT HEALTH QUESTIONNAIRE - PHQ9
SUM OF ALL RESPONSES TO PHQ QUESTIONS 1-9: 7
10. IF YOU CHECKED OFF ANY PROBLEMS, HOW DIFFICULT HAVE THESE PROBLEMS MADE IT FOR YOU TO DO YOUR WORK, TAKE CARE OF THINGS AT HOME, OR GET ALONG WITH OTHER PEOPLE: SOMEWHAT DIFFICULT
SUM OF ALL RESPONSES TO PHQ QUESTIONS 1-9: 7

## 2025-01-08 ENCOUNTER — VIRTUAL VISIT (OUTPATIENT)
Dept: FAMILY MEDICINE | Facility: OTHER | Age: 60
End: 2025-01-08
Payer: COMMERCIAL

## 2025-01-08 DIAGNOSIS — F41.1 GAD (GENERALIZED ANXIETY DISORDER): Primary | ICD-10-CM

## 2025-01-08 DIAGNOSIS — G47.00 INSOMNIA, UNSPECIFIED TYPE: ICD-10-CM

## 2025-01-08 DIAGNOSIS — F43.21 GRIEF: ICD-10-CM

## 2025-01-08 PROCEDURE — 98014 SYNCH AUDIO-ONLY EST MOD 30: CPT | Performed by: NURSE PRACTITIONER

## 2025-01-08 RX ORDER — FLUOXETINE 10 MG/1
10 CAPSULE ORAL DAILY
Qty: 90 CAPSULE | Refills: 0 | Status: SHIPPED | OUTPATIENT
Start: 2025-01-08

## 2025-01-08 RX ORDER — TRAZODONE HYDROCHLORIDE 100 MG/1
100-150 TABLET ORAL AT BEDTIME
Qty: 135 TABLET | Refills: 1 | Status: SHIPPED | OUTPATIENT
Start: 2025-01-08

## 2025-01-08 NOTE — PROGRESS NOTES
Faith is a 59 year old who is being evaluated via a billable telephone visit.    What phone number would you like to be contacted at? [phone]   How would you like to obtain your AVS? MyChart  Originating Location (pt. Location): Home    Distant Location (provider location):  On-site  Telephone visit completed due to the patient did not have access to video, while the distant provider did.    Assessment & Plan     NOLBERTO (generalized anxiety disorder)  Grief  Unstable  Recommend trial of daily medication. Was on Prozac before and did not have side effects. Will do low dose and she will mychart me in 4 weeks if need to increase to 20mg. Will also increase her Trazodone and refill sent. She will start counseling as well which I feel will help her as well.   - FLUoxetine (PROZAC) 10 MG capsule; Take 1 capsule (10 mg) by mouth daily.  - traZODone (DESYREL) 100 MG tablet; Take 1-1.5 tablets (100-150 mg) by mouth at bedtime.    Insomnia, unspecified type  As noted above   - FLUoxetine (PROZAC) 10 MG capsule; Take 1 capsule (10 mg) by mouth daily.  - traZODone (DESYREL) 100 MG tablet; Take 1-1.5 tablets (100-150 mg) by mouth at bedtime.      The patient indicates understanding of these issues and agrees with the plan.      Nicotine/Tobacco Cessation  She reports that she has been smoking cigarettes. She started smoking about 46 years ago. She has a 23 pack-year smoking history. She has been exposed to tobacco smoke. She has never used smokeless tobacco.  Nicotine/Tobacco Cessation Plan  Information offered: Patient not interested at this time        See Patient Instructions    Subjective   Faith is a 59 year old, presenting for the following health issues:   Follow Up    History of Present Illness       Mental Health Follow-up:  Patient presents to follow-up on Anxiety.    Patient's anxiety since last visit has been:  Medium  The patient is not having other symptoms associated with anxiety.  Any significant life events:  other  Patient is feeling anxious or having panic attacks.  Patient has no concerns about alcohol or drug use.    She eats 2-3 servings of fruits and vegetables daily.She consumes 0 sweetened beverage(s) daily.She exercises with enough effort to increase her heart rate 10 to 19 minutes per day.  She exercises with enough effort to increase her heart rate 3 or less days per week. She is missing 1 dose(s) of medications per week.  She is not taking prescribed medications regularly due to remembering to take.     Lost  in December.   Struggling with anxiety and insomnia, Trazodone works well, but still waking up middle of the night so thinks she could benefit from slight increase.   Did buspar years ago and had side effects.   Starting counseling grief counseling once a week starting tomorrow to help with coping and how to move forward.           Review of Systems  Constitutional, HEENT, cardiovascular, pulmonary, gi and gu systems are negative, except as otherwise noted.      Objective           Vitals:  No vitals were obtained today due to virtual visit.    Physical Exam   General: Alert and no distress //Respiratory: No audible wheeze, cough, or shortness of breath // Psychiatric:  Appropriate affect, tone, and pace of words      No results found for any visits on 01/08/25.      Phone call duration: 6 minutes  Signed Electronically by: PAMELA Osborne CNP

## 2025-01-12 ENCOUNTER — MYC MEDICAL ADVICE (OUTPATIENT)
Dept: FAMILY MEDICINE | Facility: CLINIC | Age: 60
End: 2025-01-12
Payer: COMMERCIAL

## 2025-01-23 ENCOUNTER — MYC MEDICAL ADVICE (OUTPATIENT)
Dept: FAMILY MEDICINE | Facility: OTHER | Age: 60
End: 2025-01-23
Payer: COMMERCIAL

## 2025-01-23 DIAGNOSIS — F41.1 GAD (GENERALIZED ANXIETY DISORDER): Primary | ICD-10-CM

## 2025-01-26 RX ORDER — HYDROXYZINE PAMOATE 25 MG/1
25-50 CAPSULE ORAL 3 TIMES DAILY PRN
Qty: 90 CAPSULE | Refills: 1 | Status: SHIPPED | OUTPATIENT
Start: 2025-01-26

## 2025-02-16 ENCOUNTER — HEALTH MAINTENANCE LETTER (OUTPATIENT)
Age: 60
End: 2025-02-16

## 2025-05-29 ENCOUNTER — MYC MEDICAL ADVICE (OUTPATIENT)
Dept: FAMILY MEDICINE | Facility: OTHER | Age: 60
End: 2025-05-29
Payer: COMMERCIAL

## 2025-05-29 DIAGNOSIS — E78.5 HYPERLIPIDEMIA LDL GOAL <130: ICD-10-CM

## 2025-05-29 RX ORDER — ATORVASTATIN CALCIUM 10 MG/1
10 TABLET, FILM COATED ORAL DAILY
Qty: 90 TABLET | Refills: 0 | Status: SHIPPED | OUTPATIENT
Start: 2025-05-29

## 2025-07-14 ENCOUNTER — MYC MEDICAL ADVICE (OUTPATIENT)
Dept: FAMILY MEDICINE | Facility: OTHER | Age: 60
End: 2025-07-14

## 2025-07-14 DIAGNOSIS — I10 ESSENTIAL HYPERTENSION: Primary | ICD-10-CM

## 2025-07-14 NOTE — TELEPHONE ENCOUNTER
Labs are in, please have her come in for a BP check as well.       PAMELA Osborne CNP  Questions or concerns please feel free to send me a [employer] message or call me  Phone : [phone]

## 2025-07-14 NOTE — TELEPHONE ENCOUNTER
Please call patient she was supposed to have a visit with me today?      Appointments in Next Year      Jul 17, 2025 10:45 AM  Lab visit with [facility]  [facility] ([facility]) [phone]

## 2025-07-15 ENCOUNTER — PATIENT OUTREACH (OUTPATIENT)
Dept: CARE COORDINATION | Facility: CLINIC | Age: 60
End: 2025-07-15
Payer: COMMERCIAL

## 2025-07-17 ENCOUNTER — TELEPHONE (OUTPATIENT)
Dept: FAMILY MEDICINE | Facility: OTHER | Age: 60
End: 2025-07-17

## 2025-07-17 ENCOUNTER — LAB (OUTPATIENT)
Dept: LAB | Facility: CLINIC | Age: 60
End: 2025-07-17

## 2025-07-17 ENCOUNTER — ALLIED HEALTH/NURSE VISIT (OUTPATIENT)
Dept: FAMILY MEDICINE | Facility: CLINIC | Age: 60
End: 2025-07-17

## 2025-07-17 ENCOUNTER — DOCUMENTATION ONLY (OUTPATIENT)
Dept: LAB | Facility: CLINIC | Age: 60
End: 2025-07-17

## 2025-07-17 VITALS — HEART RATE: 88 BPM | SYSTOLIC BLOOD PRESSURE: 120 MMHG | DIASTOLIC BLOOD PRESSURE: 70 MMHG

## 2025-07-17 DIAGNOSIS — E78.5 HYPERLIPIDEMIA LDL GOAL <130: ICD-10-CM

## 2025-07-17 DIAGNOSIS — I10 ESSENTIAL HYPERTENSION: Primary | ICD-10-CM

## 2025-07-17 DIAGNOSIS — E78.5 HYPERLIPIDEMIA LDL GOAL <130: Primary | ICD-10-CM

## 2025-07-17 DIAGNOSIS — I10 ESSENTIAL HYPERTENSION: ICD-10-CM

## 2025-07-17 LAB
ALBUMIN SERPL BCG-MCNC: 4.1 G/DL (ref 3.5–5.2)
ALP SERPL-CCNC: 61 U/L (ref 40–150)
ALT SERPL W P-5'-P-CCNC: 25 U/L (ref 0–50)
ANION GAP SERPL CALCULATED.3IONS-SCNC: 11 MMOL/L (ref 7–15)
AST SERPL W P-5'-P-CCNC: 23 U/L (ref 0–45)
BILIRUB SERPL-MCNC: 0.2 MG/DL
BILIRUBIN DIRECT (ROCHE PRO & PURE): 0.12 MG/DL (ref 0–0.45)
BUN SERPL-MCNC: 6.2 MG/DL (ref 8–23)
CALCIUM SERPL-MCNC: 9.9 MG/DL (ref 8.8–10.4)
CHLORIDE SERPL-SCNC: 105 MMOL/L (ref 98–107)
CHOLEST SERPL-MCNC: 160 MG/DL
CREAT SERPL-MCNC: 0.67 MG/DL (ref 0.51–0.95)
EGFRCR SERPLBLD CKD-EPI 2021: >90 ML/MIN/1.73M2
GLUCOSE SERPL-MCNC: 112 MG/DL (ref 70–99)
HCO3 SERPL-SCNC: 23 MMOL/L (ref 22–29)
HDLC SERPL-MCNC: 50 MG/DL
HOLD SPECIMEN: NORMAL
LDLC SERPL CALC-MCNC: 94 MG/DL
NONHDLC SERPL-MCNC: 110 MG/DL
POTASSIUM SERPL-SCNC: 4.3 MMOL/L (ref 3.4–5.3)
PROT SERPL-MCNC: 7 G/DL (ref 6.4–8.3)
SODIUM SERPL-SCNC: 139 MMOL/L (ref 135–145)
TRIGL SERPL-MCNC: 79 MG/DL

## 2025-07-17 NOTE — PROGRESS NOTES
Lab orders are future dated for 8/29/25, patient would like these drawn today. Please update lab orders as soon as possible for processing and testing to be completed.     Thanks!  Zach Montelongo

## 2025-07-17 NOTE — PROGRESS NOTES
I met with Faith Bates at the request of Chinyere Aguilar to recheck her blood pressure.  Blood pressure medications on the med list were reviewed with patient.    Patient has taken all medications as per usual regimen: Yes  Patient reports tolerating them without any issues or concerns: Yes    Vitals:    07/17/25 1135   BP: 120/70   BP Location: Left arm   Patient Position: Chair   Cuff Size: Adult Regular   Pulse: 88       Blood pressure was taken, previous encounter was reviewed, recorded blood pressure below 140/90.  Patient was discharged and the note will be sent to the provider for final review.

## 2025-08-03 ENCOUNTER — MYC REFILL (OUTPATIENT)
Dept: FAMILY MEDICINE | Facility: CLINIC | Age: 60
End: 2025-08-03

## 2025-08-03 ENCOUNTER — MYC MEDICAL ADVICE (OUTPATIENT)
Dept: FAMILY MEDICINE | Facility: CLINIC | Age: 60
End: 2025-08-03

## 2025-08-03 DIAGNOSIS — G47.00 INSOMNIA, UNSPECIFIED TYPE: ICD-10-CM

## 2025-08-04 RX ORDER — TRAZODONE HYDROCHLORIDE 50 MG/1
50-100 TABLET ORAL AT BEDTIME
Qty: 180 TABLET | Refills: 0 | Status: SHIPPED | OUTPATIENT
Start: 2025-08-04

## 2025-08-17 ASSESSMENT — PATIENT HEALTH QUESTIONNAIRE - PHQ9
SUM OF ALL RESPONSES TO PHQ QUESTIONS 1-9: 1
SUM OF ALL RESPONSES TO PHQ QUESTIONS 1-9: 1
10. IF YOU CHECKED OFF ANY PROBLEMS, HOW DIFFICULT HAVE THESE PROBLEMS MADE IT FOR YOU TO DO YOUR WORK, TAKE CARE OF THINGS AT HOME, OR GET ALONG WITH OTHER PEOPLE: NOT DIFFICULT AT ALL

## 2025-08-18 ENCOUNTER — VIRTUAL VISIT (OUTPATIENT)
Dept: FAMILY MEDICINE | Facility: OTHER | Age: 60
End: 2025-08-18

## 2025-08-18 DIAGNOSIS — F41.1 GAD (GENERALIZED ANXIETY DISORDER): ICD-10-CM

## 2025-08-18 DIAGNOSIS — G47.00 INSOMNIA, UNSPECIFIED TYPE: ICD-10-CM

## 2025-08-18 DIAGNOSIS — E78.5 HYPERLIPIDEMIA LDL GOAL <130: Primary | ICD-10-CM

## 2025-08-18 DIAGNOSIS — Z12.31 VISIT FOR SCREENING MAMMOGRAM: ICD-10-CM

## 2025-08-18 DIAGNOSIS — I10 ESSENTIAL HYPERTENSION: ICD-10-CM

## 2025-08-18 PROCEDURE — 98006 SYNCH AUDIO-VIDEO EST MOD 30: CPT | Performed by: NURSE PRACTITIONER

## 2025-08-18 RX ORDER — HYDROXYZINE PAMOATE 25 MG/1
25-50 CAPSULE ORAL 3 TIMES DAILY PRN
Qty: 90 CAPSULE | Refills: 1 | Status: SHIPPED | OUTPATIENT
Start: 2025-08-18

## 2025-08-18 RX ORDER — ATORVASTATIN CALCIUM 10 MG/1
10 TABLET, FILM COATED ORAL DAILY
Qty: 90 TABLET | Refills: 3 | Status: SHIPPED | OUTPATIENT
Start: 2025-08-18

## 2025-08-18 RX ORDER — LOSARTAN POTASSIUM 50 MG/1
50 TABLET ORAL DAILY
Qty: 90 TABLET | Refills: 1 | Status: SHIPPED | OUTPATIENT
Start: 2025-08-18

## 2025-08-18 RX ORDER — TRAZODONE HYDROCHLORIDE 50 MG/1
50-100 TABLET ORAL AT BEDTIME
Qty: 180 TABLET | Refills: 1 | Status: SHIPPED | OUTPATIENT
Start: 2025-08-18